# Patient Record
Sex: MALE | Race: WHITE | NOT HISPANIC OR LATINO
[De-identification: names, ages, dates, MRNs, and addresses within clinical notes are randomized per-mention and may not be internally consistent; named-entity substitution may affect disease eponyms.]

---

## 2022-07-13 ENCOUNTER — APPOINTMENT (OUTPATIENT)
Dept: ORTHOPEDIC SURGERY | Facility: CLINIC | Age: 62
End: 2022-07-13

## 2022-07-13 VITALS — HEIGHT: 72 IN | WEIGHT: 300 LBS | BODY MASS INDEX: 40.63 KG/M2

## 2022-07-13 PROCEDURE — 99213 OFFICE O/P EST LOW 20 MIN: CPT

## 2022-07-13 NOTE — HISTORY OF PRESENT ILLNESS
[Gradual] : gradual [0] : 0 [de-identified] : 7/13/22: This is Mr. EDOUARD GONZALES  a 62 year old male with bilateral long standing knee arthritis who comes in today complaining of bilateral knee pain.  Treated for several years with HA injections every 6 months. Has had excellent results and pain is now starting again. Wants to start Visco-3. Last series November 2021. \par Hx of knee arthroscopy and CSI, PT in the past.\par \par 4/17/21: f/u B/L knees, for Synvisc #1 B/L knees today. \par 4/22/21: Here for Synvisc #2 to both knees.\par 4/28/21- Synvisc #3 bilateral knees today.\par 9/27/21- he recently had increased irritation and inflammation to right knee knee. notes difficutly walking. medial pain. \par 11/16/21- visco-3 #1 b knee today\par 11/30/21- Here for visco-3 #3 in b/l knees today. He was seen by Dr. Neri last week for #2 inj [] : no [FreeTextEntry6] : na [FreeTextEntry7] : na [FreeTextEntry8] : na [FreeTextEntry9] : na [de-identified] : na

## 2022-07-13 NOTE — ASSESSMENT
[FreeTextEntry1] : will submit for auth to repeat HA injections to bilateral knees. fu for injections. \par \par 4/17/21: B/L knee Synvisc tolerated well in office today. Ice to affected area. fu next week to continue series.\par 4/22/21: Synvisc #2 to both knees. Tolerated well. RTC 1 week.\par 4/28/21- Synvisc #3 bilateral knees. fu 6 monhts for repeat injection. \par 9/27/21 - Right knee CSI tolerated well. 45 cc NSF aspirated. Will request auth B/L knee euflexxa\par 10/19/21-diclfoenac. fu 2 weeks for HA injections. submit for auth for euflexxa bilateral knee. diclo\par 11/16/21- visco-3 #1 b knee today\par 11/30/21- visco-3 #3 b knee today. tolerated well

## 2022-07-13 NOTE — IMAGING
[de-identified] : RIght knee: \par \par Inspection: \par moderate effusion\par no atrophy, skin intact\par \par Palpation: \par anterior and medial joint line tenderness\par \par ROM: \par Flexion 120 with pain\par Ext 0 with pain\par \par Strength:\par quadriceps 5/5\par hamstrings 5/5\par \par Ligament stability and special tests\par Mc Flores (+)\par Pivot shift (+)\par Ligament stable\par \par Neuro:\par Light touch intact\par \par Gait and function:\par mild antalgic (-)\par assistive device (-)\par \par Left knee:\par \par same as the right knee exam\par \par

## 2022-07-19 ENCOUNTER — APPOINTMENT (OUTPATIENT)
Dept: ORTHOPEDIC SURGERY | Facility: CLINIC | Age: 62
End: 2022-07-19

## 2022-07-19 VITALS — WEIGHT: 300 LBS | BODY MASS INDEX: 40.63 KG/M2 | HEIGHT: 72 IN

## 2022-07-19 PROCEDURE — 20610 DRAIN/INJ JOINT/BURSA W/O US: CPT | Mod: 50

## 2022-07-19 PROCEDURE — 99213 OFFICE O/P EST LOW 20 MIN: CPT | Mod: 25

## 2022-07-19 NOTE — ASSESSMENT
[FreeTextEntry1] : will submit for auth to repeat HA injections to bilateral knees. fu for injections. \par \par 4/17/21: B/L knee Synvisc tolerated well in office today. Ice to affected area. fu next week to continue series.\par 4/22/21: Synvisc #2 to both knees. Tolerated well. RTC 1 week.\par 4/28/21- Synvisc #3 bilateral knees. fu 6 monhts for repeat injection. \par 9/27/21 - Right knee CSI tolerated well. 45 cc NSF aspirated. Will request auth B/L knee euflexxa\par 10/19/21-diclfoenac. fu 2 weeks for HA injections. submit for auth for euflexxa bilateral knee. diclo\par 11/16/21- visco-3 #1 b knee today\par 11/30/21- visco-3 #3 b knee today. tolerated well\par \par 7/19/22: visco-3 #1 b knee today, tolerated well

## 2022-07-19 NOTE — HISTORY OF PRESENT ILLNESS
[Gradual] : gradual [0] : 0 [1] : 1 [Other:____] : [unfilled] [de-identified] : 7/13/22: This is MrOmar GONZALES  a 62 year old male with bilateral long standing knee arthritis who comes in today complaining of bilateral knee pain.  Treated for several years with HA injections every 6 months. Has had excellent results and pain is now starting again. Wants to start Visco-3. Last series November 2021. \par Hx of knee arthroscopy and CSI, PT in the past.\par \par 4/17/21: f/u B/L knees, for Synvisc #1 B/L knees today. \par 4/22/21: Here for Synvisc #2 to both knees.\par 4/28/21- Synvisc #3 bilateral knees today.\par 9/27/21- he recently had increased irritation and inflammation to right knee knee. notes difficutly walking. medial pain. \par 11/16/21- visco-3 #1 b knee today\par 11/30/21- Here for visco-3 #3 in b/l knees today. He was seen by Dr. Neri last week for #2 inj\par \par 7/19/22: HEre for Visco-3 #1 B/L knees  [] : This patient has had an injection before: no [FreeTextEntry6] : na [FreeTextEntry7] : na [FreeTextEntry8] : na [FreeTextEntry9] : na [de-identified] : na

## 2022-07-19 NOTE — IMAGING
[de-identified] : RIght knee: \par \par Inspection: \par moderate effusion\par no atrophy, skin intact\par \par Palpation: \par anterior and medial joint line tenderness\par \par ROM: \par Flexion 120 with pain\par Ext 0 with pain\par \par Strength:\par quadriceps 5/5\par hamstrings 5/5\par \par Ligament stability and special tests\par Mc Flores (+)\par Pivot shift (+)\par Ligament stable\par \par Neuro:\par Light touch intact\par \par Gait and function:\par mild antalgic (-)\par assistive device (-)\par \par Left knee:\par \par same as the right knee exam\par \par

## 2022-07-19 NOTE — PROCEDURE
[Large Joint Injection] : Large joint injection [Bilateral] : bilaterally of the [Knee] : knee [Pain] : pain [Inflammation] : inflammation [X-ray evidence of Osteoarthritis on this or prior visit] : x-ray evidence of Osteoarthritis on this or prior visit [Repeat series performed] : repeat series performed [Alcohol] : alcohol [Betadine] : betadine [Ethyl Chloride sprayed topically] : ethyl chloride sprayed topically [Sterile technique used] : sterile technique used [___ cc    1%] : Lidocaine ~Vcc of 1%  [Visco-3] : Visco-3 [#1] : series #1 [] : Patient tolerated procedure well [Call if redness, pain or fever occur] : call if redness, pain or fever occur [Apply ice for 15min out of every hour for the next 12-24 hours as tolerated] : apply ice for 15 minutes out of every hour for the next 12-24 hours as tolerated [Patient was advised to rest the joint(s) for ____ days] : patient was advised to rest the joint(s) for [unfilled] days [Previous OTC use and PT nontherapeutic] : patient has tried OTC's including aspirin, Ibuprofen, Aleve, etc or prescription NSAIDS, and/or exercises at home and/or physical therapy without satisfactory response [Risks, benefits, alternatives discussed / Verbal consent obtained] : the risks benefits, and alternatives have been discussed, and verbal consent was obtained

## 2022-07-26 ENCOUNTER — APPOINTMENT (OUTPATIENT)
Dept: ORTHOPEDIC SURGERY | Facility: CLINIC | Age: 62
End: 2022-07-26

## 2022-07-26 VITALS — WEIGHT: 300 LBS | BODY MASS INDEX: 40.63 KG/M2 | HEIGHT: 72 IN

## 2022-07-26 DIAGNOSIS — Z78.9 OTHER SPECIFIED HEALTH STATUS: ICD-10-CM

## 2022-07-26 PROCEDURE — 20610 DRAIN/INJ JOINT/BURSA W/O US: CPT | Mod: 50

## 2022-07-26 PROCEDURE — 99024 POSTOP FOLLOW-UP VISIT: CPT

## 2022-07-26 NOTE — IMAGING
[de-identified] : RIght knee: \par \par Inspection: \par moderate effusion\par no atrophy, skin intact\par \par Palpation: \par anterior and medial joint line tenderness\par \par ROM: \par Flexion 120 with pain\par Ext 0 with pain\par \par Strength:\par quadriceps 5/5\par hamstrings 5/5\par \par Ligament stability and special tests\par Mc Flores (+)\par Pivot shift (+)\par Ligament stable\par \par Neuro:\par Light touch intact\par \par Gait and function:\par mild antalgic (-)\par assistive device (-)\par \par Left knee:\par \par same as the right knee exam\par \par

## 2022-07-26 NOTE — HISTORY OF PRESENT ILLNESS
[Gradual] : gradual [0] : 0 [Other:____] : [unfilled] [2] : 2 [de-identified] : here for visco 3 # 3 injecttion bilatera knees today\par \par \par 7/13/22: This is Mr. EDOUARD GONZALES  a 62 year old male with bilateral long standing knee arthritis who comes in today complaining of bilateral knee pain.  Treated for several years with HA injections every 6 months. Has had excellent results and pain is now starting again. Wants to start Visco-3. Last series November 2021. \par Hx of knee arthroscopy and CSI, PT in the past.\par \par  [] : Post Surgical Visit: no [FreeTextEntry6] : na [FreeTextEntry7] : na [FreeTextEntry8] : na [FreeTextEntry9] : na [de-identified] : na [de-identified] : 7/19/22 [de-identified] : john knees

## 2022-07-26 NOTE — DISCUSSION/SUMMARY
[Medication Risks Reviewed] : Medication risks reviewed [de-identified] : visco 3 #3 injected into bilateral knees today. fu 6 months prn. \par \par \par The patient was advised of the diagnosis.  The natural history of the pathology was explained in full. All questions were answered.  The risks and benefits of conservative and interventional treatment alternatives were explained to the patient\par \par ------------------------------------------------------------------------------------------------------------------------------------------------------\par \par Viscosupplementation injection given: to each knee\par \par Viscosupplementation injection indications at this time include- X-ray evidence of osteoarthritis on this or prior visits, and patient having tried OTC's including aspirin, Ibuprofen, Aleve etc or prescription NSAIDS, and/or exercises at home and/ or physical therapy without satisfactory response.\par \par The risks, benefits, and alternatives to viscosupplementation injection were explained in full to the patient. Risks outlined include but are not limited to infection, sepsis, bleeding, scarring, skin discoloration, temporary increase in pain, syncopal episode, failure to resolve symptoms, allergic reaction, and symptom recurrence.  Patient understood the risks. All questions were answered. After discussion of options, the patient requested viscosupplementation. \par \par An injection of Hyaluronic acid of appropriate formulation was injected into the joint(s) after verbal consent, using sterile technique. The patient tolerated the procedure well and there were no complications.  Instructed patient to apply ice to the injection site. Signs and symptoms of infection reviewed and patient advised to call immediately for redness, fevers, and/or chills.\par \par ------------------------------------------------------------------------------------------------------------------------------------------------------\par \par

## 2022-08-01 ENCOUNTER — APPOINTMENT (OUTPATIENT)
Dept: ORTHOPEDIC SURGERY | Facility: CLINIC | Age: 62
End: 2022-08-01

## 2022-08-01 PROCEDURE — 20610 DRAIN/INJ JOINT/BURSA W/O US: CPT | Mod: 50

## 2022-08-01 PROCEDURE — 99214 OFFICE O/P EST MOD 30 MIN: CPT | Mod: 25

## 2022-08-01 PROCEDURE — 99204 OFFICE O/P NEW MOD 45 MIN: CPT | Mod: 25

## 2022-08-01 NOTE — IMAGING

## 2022-08-01 NOTE — PROCEDURE
[FreeTextEntry3] : \par Injection Procedure Note:\par \par The risks, benefits, and alternatives to viscosupplementation injection were reviewed with the patient. Risks outlined include but are not limited to infection, sepsis, bleeding, scarring, temporary increase in pain, syncopal episode, failure to resolve symptoms, symptoms recurrence, allergic reaction, flare reaction, pseudoseptic reaction.  Patient understood the risks and asked to proceed with this treatment course.\par \par Patient Identification\par Name/: Verbal with patient and/or family\par \par Procedure Verification:\par Procedure confirmed with patient or family/designee\par Consent for procedure: Verbal Consent Given\par Relevant documentation completed, reviewed, and signed\par Clinical indications for procedure confirmed\par \par Time-out with all members of procedure team immediately prior to procedure:\par Correct patient identified. Agreement on procedure. Correct side and site.\par \par \par KNEE INJECTION (Visco) - BILATERAL\par After verbal consent and identification of the correct patient and correct site, bilateral knees were prepped using alcohol swabs and betadine. This was allowed time to air dry. \par An injection of  Visco-3 2.5ml \par was injected into the knees using a sterile 22G needle after ethyl chloride spray for skin anesthesia. The patient tolerated the procedure well. After-care instructions were provided and included instructions to ice the area and to call if redness, pain, or fever develop.\par

## 2022-08-01 NOTE — HISTORY OF PRESENT ILLNESS
[de-identified] : 62 year old male  chronic bilateral knee pain that is ant and medial and deep and worse with acvtivity. assoc with clicking and stiffnes.  has seen Dr. CH and done CSI, gel , PT in past \par \par \par

## 2022-08-01 NOTE — ASSESSMENT
[FreeTextEntry1] : Imaging was reviewed and independently interpreted, notes from Dr. CH reviewed\par xrays show medial and patellofemoral degenerate changes.\par \par - We discussed their diagnosis and treatment options at length including surgical and non-surgical options.\par - We will continue conservative treatment with activity modification, PT, icing, weight loss, and anti-inflammatory medications.\par - The patient was provided with a PT prescription to work on ROM, hip ER/abductors strengthening, quad/hamstring stretches and strengthening, and other exercises \par - The patient was advised to let pain guide the gradual advancement of activities. \par -  we spoke about possibility of viscosupplementation injections and they want to proceed\par - b/l visco given torie well\par

## 2022-08-02 ENCOUNTER — APPOINTMENT (OUTPATIENT)
Dept: ORTHOPEDIC SURGERY | Facility: CLINIC | Age: 62
End: 2022-08-02

## 2023-02-19 ENCOUNTER — APPOINTMENT (OUTPATIENT)
Dept: ORTHOPEDIC SURGERY | Facility: CLINIC | Age: 63
End: 2023-02-19
Payer: COMMERCIAL

## 2023-02-19 VITALS — HEIGHT: 72 IN | BODY MASS INDEX: 40.63 KG/M2 | WEIGHT: 300 LBS

## 2023-02-19 PROCEDURE — 99213 OFFICE O/P EST LOW 20 MIN: CPT

## 2023-02-19 NOTE — DISCUSSION/SUMMARY
[de-identified] : 62m with b/l knee djd. history of visco with good relief.  will submit for repeat visco\par 1) wbat\par 2) cryotherapy\par 3) rtc  for injections\par

## 2023-02-19 NOTE — IMAGING

## 2023-02-19 NOTE — HISTORY OF PRESENT ILLNESS
[1] : 2 [Rest] : rest [de-identified] : b/l knee [FreeTextEntry5] : pt states he sees dr PHILLIPS for his b/l knee, states no specific injury. completed visco in early august. the pain just started coming back

## 2023-02-22 ENCOUNTER — APPOINTMENT (OUTPATIENT)
Dept: ORTHOPEDIC SURGERY | Facility: CLINIC | Age: 63
End: 2023-02-22
Payer: COMMERCIAL

## 2023-02-22 VITALS — WEIGHT: 300 LBS | HEIGHT: 72 IN | BODY MASS INDEX: 40.63 KG/M2

## 2023-02-22 PROCEDURE — 73564 X-RAY EXAM KNEE 4 OR MORE: CPT | Mod: 50

## 2023-02-22 PROCEDURE — 99213 OFFICE O/P EST LOW 20 MIN: CPT

## 2023-02-22 NOTE — HISTORY OF PRESENT ILLNESS
[de-identified] : 02/22/2023 : EDOUARD GONZALES is a 62 year male presenting today for bilateral knee pain R>L. History of chronic knee pain for >10 years with no specific ANDI. Worse with increased activity. Better with rest. Prior treatment: visco injections with Dr. Swain in july 2022. Occupation: CPA\par

## 2023-02-22 NOTE — IMAGING
[de-identified] : \par RIGHT KNEE\par Inspection:  mild effusion\par Palpation: medial joint line tenderness, anterior tenderness\par Knee Range of Motion:  3-125 \par Strength: 5/5 Quadriceps strength, 5/5 Hamstring strength\par Neurological: light touch is intact throughout\par Ligament Stability and Special Tests: \par McMurrays: neg\par Lachman: neg\par Pivot Shift: neg\par Posterior Drawer: neg\par Valgus: neg\par Varus: neg\par Patella Apprehension: neg\par Patella Maltracking: neg\par \par \par LEFT KNEE\par Inspection:  mild effusion\par Palpation: medial joint line tenderness, anterior tenderness\par Knee Range of Motion:  3-125 \par Strength: 5/5 Quadriceps strength, 5/5 Hamstring strength\par Neurological: light touch is intact throughout\par Ligament Stability and Special Tests: \par McMurrays: neg\par Lachman: neg\par Pivot Shift: neg\par Posterior Drawer: neg\par Valgus: neg\par Varus: neg\par Patella Apprehension: neg\par Patella Maltracking: neg\par  [Bilateral] : knee bilaterally [All Views] : anteroposterior, lateral, skyline, and anteroposterior standing [There are no fractures, subluxations or dislocations. No significant abnormalities are seen] : There are no fractures, subluxations or dislocations. No significant abnormalities are seen [Degenerative change] : Degenerative change

## 2023-02-22 NOTE — DISCUSSION/SUMMARY
[de-identified] : 62m with b/l knee djd. history of visco with good relief.  will submit for repeat visco-3 injections\par 1) wbat\par 2) cryotherapy\par 3) rtc  for injections\par \par Entered by Gloria Wilson acting as scribe.

## 2023-03-01 ENCOUNTER — APPOINTMENT (OUTPATIENT)
Dept: ORTHOPEDIC SURGERY | Facility: CLINIC | Age: 63
End: 2023-03-01
Payer: COMMERCIAL

## 2023-03-01 VITALS — BODY MASS INDEX: 40.63 KG/M2 | HEIGHT: 72 IN | WEIGHT: 300 LBS

## 2023-03-01 PROCEDURE — 99212 OFFICE O/P EST SF 10 MIN: CPT | Mod: 25

## 2023-03-01 PROCEDURE — 20610 DRAIN/INJ JOINT/BURSA W/O US: CPT

## 2023-03-01 NOTE — HISTORY OF PRESENT ILLNESS
[Other:____] : [unfilled] [de-identified] : 03/01/23: Here to f/up b/l knees and Visco-3 #1\par 02/22/2023 : EDOUARD GONZALES is a 62 year male presenting today for bilateral knee pain R>L. History of chronic knee pain for >10 years with no specific ANDI. Worse with increased activity. Better with rest. Prior treatment: visco injections with Dr. Swain in july 2022. Occupation: CPA\par  [] : This patient has had an injection before: no [FreeTextEntry1] : bilateral knees

## 2023-03-01 NOTE — DISCUSSION/SUMMARY
[de-identified] : 62m with b/l knee djd. Visco-3 #1. Injection tolerated well \par 1) wbat, cryotherapy\par 2) rtc 1 week \par \par Entered by Gloria Wilson acting as scribe.

## 2023-03-01 NOTE — PROCEDURE
[Large Joint Injection] : Large joint injection [Bilateral] : bilaterally of the [Knee] : knee [Pain] : pain [Inflammation] : inflammation [Alcohol] : alcohol [Betadine] : betadine [Ethyl Chloride sprayed topically] : ethyl chloride sprayed topically [Sterile technique used] : sterile technique used [Visco-3 (25mg)] : 25mg of Visco-3 [#1] : series #1 [] : Patient tolerated procedure well [Previous OTC use and PT nontherapeutic] : patient has tried OTC's including aspirin, Ibuprofen, Aleve, etc or prescription NSAIDS, and/or exercises at home and/or physical therapy without satisfactory response [Patient had decreased mobility in the joint] : patient had decreased mobility in the joint [Risks, benefits, alternatives discussed / Verbal consent obtained] : the risks benefits, and alternatives have been discussed, and verbal consent was obtained

## 2023-03-01 NOTE — IMAGING

## 2023-03-07 ENCOUNTER — APPOINTMENT (OUTPATIENT)
Dept: ORTHOPEDIC SURGERY | Facility: CLINIC | Age: 63
End: 2023-03-07
Payer: COMMERCIAL

## 2023-03-07 VITALS — HEIGHT: 72 IN | BODY MASS INDEX: 40.63 KG/M2 | WEIGHT: 300 LBS

## 2023-03-07 PROCEDURE — 99212 OFFICE O/P EST SF 10 MIN: CPT | Mod: 25

## 2023-03-07 PROCEDURE — 20610 DRAIN/INJ JOINT/BURSA W/O US: CPT

## 2023-03-07 NOTE — HISTORY OF PRESENT ILLNESS
[1] : 2 [de-identified] : 3/7/23: Here to f/up b/l knees and Visco-3 #2\par 03/01/23: Here to f/up b/l knees and Visco-3 #1\par 02/22/2023 : EDOUARD GONZALES is a 62 year male presenting today for bilateral knee pain R>L. History of chronic knee pain for >10 years with no specific ANDI. Worse with increased activity. Better with rest. Prior treatment: visco injections with Dr. Swain in july 2022. Occupation: CPA\par 3/7/23: B/L knee visco three #2. Doing well. Some swelling to the right knee, no fevers/chills. Does report improvement after the injections.

## 2023-03-07 NOTE — DISCUSSION/SUMMARY
[de-identified] : 62m with b/l knee djd. Visco-3 #2. Injection tolerated well \par 1) wbat, cryotherapy\par 2) rtc 1 week \par \par Entered by Gloria Wilson acting as scribe.\par \par \par

## 2023-03-07 NOTE — PROCEDURE
[Large Joint Injection] : Large joint injection [Bilateral] : bilaterally of the [Knee] : knee [Pain] : pain [Inflammation] : inflammation [Alcohol] : alcohol [Betadine] : betadine [Ethyl Chloride sprayed topically] : ethyl chloride sprayed topically [Sterile technique used] : sterile technique used [Visco-3 (25mg)] : 25mg of Visco-3 [#2] : series #2 [] : Patient tolerated procedure well [Previous OTC use and PT nontherapeutic] : patient has tried OTC's including aspirin, Ibuprofen, Aleve, etc or prescription NSAIDS, and/or exercises at home and/or physical therapy without satisfactory response [Patient had decreased mobility in the joint] : patient had decreased mobility in the joint [Risks, benefits, alternatives discussed / Verbal consent obtained] : the risks benefits, and alternatives have been discussed, and verbal consent was obtained

## 2023-03-07 NOTE — IMAGING

## 2023-03-15 ENCOUNTER — APPOINTMENT (OUTPATIENT)
Dept: ORTHOPEDIC SURGERY | Facility: CLINIC | Age: 63
End: 2023-03-15
Payer: COMMERCIAL

## 2023-03-15 PROCEDURE — 99212 OFFICE O/P EST SF 10 MIN: CPT | Mod: 25

## 2023-03-15 PROCEDURE — 20610 DRAIN/INJ JOINT/BURSA W/O US: CPT | Mod: 50

## 2023-03-15 NOTE — IMAGING

## 2023-03-15 NOTE — DISCUSSION/SUMMARY
[de-identified] : 62m with b/l knee djd. Visco-3 #3. Injection tolerated well \par 1) wbat, cryotherapy\par 2) rtc 6-8 weeks \par \par Entered by Gloria Wilson acting as scribe.\par \par \par

## 2023-03-15 NOTE — PROCEDURE
[Large Joint Injection] : Large joint injection [Bilateral] : bilaterally of the [Knee] : knee [Pain] : pain [Inflammation] : inflammation [Alcohol] : alcohol [Betadine] : betadine [Ethyl Chloride sprayed topically] : ethyl chloride sprayed topically [Sterile technique used] : sterile technique used [Visco-3 (25mg)] : 25mg of Visco-3 [] : Patient tolerated procedure well [Previous OTC use and PT nontherapeutic] : patient has tried OTC's including aspirin, Ibuprofen, Aleve, etc or prescription NSAIDS, and/or exercises at home and/or physical therapy without satisfactory response [Patient had decreased mobility in the joint] : patient had decreased mobility in the joint [Risks, benefits, alternatives discussed / Verbal consent obtained] : the risks benefits, and alternatives have been discussed, and verbal consent was obtained [#3] : series #3

## 2023-03-15 NOTE — HISTORY OF PRESENT ILLNESS
[de-identified] : 3/15/23: here to f/up b/l knees and Visco-3 #3\par 3/7/23: Here to f/up b/l knees and Visco-3 #2\par 03/01/23: Here to f/up b/l knees and Visco-3 #1\par 02/22/2023 : EDOUARD GONZALES is a 62 year male presenting today for bilateral knee pain R>L. History of chronic knee pain for >10 years with no specific ANDI. Worse with increased activity. Better with rest. Prior treatment: visco injections with Dr. Swain in july 2022. Occupation: CPA\par 3/7/23: B/L knee visco three #2. Doing well. Some swelling to the right knee, no fevers/chills. Does report improvement after the injections.

## 2023-11-17 ENCOUNTER — APPOINTMENT (OUTPATIENT)
Dept: ORTHOPEDIC SURGERY | Facility: CLINIC | Age: 63
End: 2023-11-17
Payer: COMMERCIAL

## 2023-11-17 VITALS — HEIGHT: 72 IN | WEIGHT: 300 LBS | BODY MASS INDEX: 40.63 KG/M2

## 2023-11-17 PROCEDURE — 99214 OFFICE O/P EST MOD 30 MIN: CPT

## 2023-11-22 ENCOUNTER — APPOINTMENT (OUTPATIENT)
Dept: ORTHOPEDIC SURGERY | Facility: CLINIC | Age: 63
End: 2023-11-22
Payer: COMMERCIAL

## 2023-11-22 VITALS — HEIGHT: 72 IN | WEIGHT: 300 LBS | BODY MASS INDEX: 40.63 KG/M2

## 2023-11-22 PROCEDURE — 99024 POSTOP FOLLOW-UP VISIT: CPT

## 2023-11-22 PROCEDURE — 20610 DRAIN/INJ JOINT/BURSA W/O US: CPT | Mod: 50

## 2023-11-29 ENCOUNTER — APPOINTMENT (OUTPATIENT)
Dept: ORTHOPEDIC SURGERY | Facility: CLINIC | Age: 63
End: 2023-11-29
Payer: COMMERCIAL

## 2023-11-29 VITALS — HEIGHT: 72 IN | BODY MASS INDEX: 40.63 KG/M2 | WEIGHT: 300 LBS

## 2023-11-29 PROCEDURE — 99024 POSTOP FOLLOW-UP VISIT: CPT

## 2023-11-29 PROCEDURE — 20610 DRAIN/INJ JOINT/BURSA W/O US: CPT | Mod: 50

## 2023-12-05 ENCOUNTER — APPOINTMENT (OUTPATIENT)
Dept: ORTHOPEDIC SURGERY | Facility: CLINIC | Age: 63
End: 2023-12-05
Payer: COMMERCIAL

## 2023-12-05 VITALS — HEIGHT: 72 IN | WEIGHT: 300 LBS | BODY MASS INDEX: 40.63 KG/M2

## 2023-12-05 PROCEDURE — 99024 POSTOP FOLLOW-UP VISIT: CPT

## 2023-12-05 PROCEDURE — 20610 DRAIN/INJ JOINT/BURSA W/O US: CPT | Mod: 50

## 2024-02-14 ENCOUNTER — APPOINTMENT (OUTPATIENT)
Dept: ORTHOPEDIC SURGERY | Facility: CLINIC | Age: 64
End: 2024-02-14
Payer: COMMERCIAL

## 2024-02-14 VITALS — BODY MASS INDEX: 41.31 KG/M2 | HEIGHT: 72 IN | WEIGHT: 305 LBS

## 2024-02-14 PROCEDURE — 73610 X-RAY EXAM OF ANKLE: CPT | Mod: RT

## 2024-02-14 PROCEDURE — L1902: CPT | Mod: RT

## 2024-02-14 PROCEDURE — 99214 OFFICE O/P EST MOD 30 MIN: CPT

## 2024-02-14 NOTE — ASSESSMENT
[FreeTextEntry1] : wbat PT mobic avoid jumping/cutting airheel f/up 6wks  The patient's current medication management of their orthopedic diagnosis was reviewed today:  (1) We discussed a comprehensive treatment plan that included possible pharmaceutical management involving the use of prescription strength medications including but not limited to options such as oral Naprosyn 500mg BID, once daily Meloxicam 15 mg, or 500-650 mg Tylenol versus over the counter oral medications and topical prescription NSAID Pennsaid vs over the counter Voltaren gel. (2) There is a moderate risk of morbidity with further treatment, especially from use of prescription strength medications and possible side effects of these medications which include upset stomach with oral medications, skin reactions to topical medications and cardiac/renal issues with long term use. (3) I recommended that the patient follow-up with their medical physician to discuss any significant specific potential issues with long term medication use such as interactions with current medications or with exacerbation of underlying medical comorbidities. (4) The benefits and risks associated with use of injectable, oral or topical, prescription and over the counter anti-inflammatory medications were discussed with the patient. The patient voiced understanding of the risks including but not limited to bleeding, stroke, kidney dysfunction, heart disease, and were referred to the black box warning label for further information.

## 2024-02-14 NOTE — PHYSICAL EXAM
[Right] : right foot and ankle [Mild] : mild swelling over achilles tendon [NL (40)] : plantar flexion 40 degrees [5___] : plantar flexion 5[unfilled]/5 [2+] : dorsalis pedis pulse: 2+ [] : patient ambulates without assistive device [de-identified] : normal bullard [TWNoteComboBox7] : dorsiflexion 15 degrees

## 2024-02-14 NOTE — HISTORY OF PRESENT ILLNESS
[Sharp] : sharp [Constant] : constant [de-identified] : 02/14/2024: 1.5 wks achilles pain. denies specific injury. no prior ankle probs. no tx to date. denies dm/tob.  [] : Post Surgical Visit: no [FreeTextEntry1] : CARINA Magaña

## 2024-03-12 ENCOUNTER — RX RENEWAL (OUTPATIENT)
Age: 64
End: 2024-03-12

## 2024-03-12 RX ORDER — MELOXICAM 15 MG/1
15 TABLET ORAL
Qty: 30 | Refills: 0 | Status: ACTIVE | COMMUNITY
Start: 2024-02-14 | End: 1900-01-01

## 2024-03-27 ENCOUNTER — APPOINTMENT (OUTPATIENT)
Dept: ORTHOPEDIC SURGERY | Facility: CLINIC | Age: 64
End: 2024-03-27
Payer: COMMERCIAL

## 2024-03-27 VITALS — HEIGHT: 72 IN | BODY MASS INDEX: 41.31 KG/M2 | WEIGHT: 305 LBS

## 2024-03-27 PROCEDURE — 99213 OFFICE O/P EST LOW 20 MIN: CPT

## 2024-03-27 NOTE — HISTORY OF PRESENT ILLNESS
[Sharp] : sharp [Constant] : constant [de-identified] : 02/14/2024: 1.5 wks achilles pain. denies specific injury. no prior ankle probs. no tx to date. denies dm/tob.   03/27/2024:  improving. going to PT. not using brace on instruction of PT [] : Post Surgical Visit: no [FreeTextEntry1] : CARINA Magaña

## 2024-03-27 NOTE — PHYSICAL EXAM
[Right] : right foot and ankle [Mild] : mild swelling over achilles tendon [NL (40)] : plantar flexion 40 degrees [5___] : plantar flexion 5[unfilled]/5 [2+] : dorsalis pedis pulse: 2+ [] : no deltoid ligament tenderness [FreeTextEntry8] : improved [de-identified] : normal bullard [TWNoteComboBox7] : dorsiflexion 15 degrees

## 2024-03-27 NOTE — ASSESSMENT
[FreeTextEntry1] : wbat PT taking mobic avoid jumping/cutting f/up 6wks  The patient's current medication management of their orthopedic diagnosis was reviewed today:  (1) We discussed a comprehensive treatment plan that included possible pharmaceutical management involving the use of prescription strength medications including but not limited to options such as oral Naprosyn 500mg BID, once daily Meloxicam 15 mg, or 500-650 mg Tylenol versus over the counter oral medications and topical prescription NSAID Pennsaid vs over the counter Voltaren gel. (2) There is a moderate risk of morbidity with further treatment, especially from use of prescription strength medications and possible side effects of these medications which include upset stomach with oral medications, skin reactions to topical medications and cardiac/renal issues with long term use. (3) I recommended that the patient follow-up with their medical physician to discuss any significant specific potential issues with long term medication use such as interactions with current medications or with exacerbation of underlying medical comorbidities. (4) The benefits and risks associated with use of injectable, oral or topical, prescription and over the counter anti-inflammatory medications were discussed with the patient. The patient voiced understanding of the risks including but not limited to bleeding, stroke, kidney dysfunction, heart disease, and were referred to the black box warning label for further information.

## 2024-04-04 ENCOUNTER — EMERGENCY (EMERGENCY)
Facility: HOSPITAL | Age: 64
LOS: 1 days | Discharge: HOME | End: 2024-04-04
Attending: EMERGENCY MEDICINE
Payer: COMMERCIAL

## 2024-04-04 VITALS
HEART RATE: 87 BPM | SYSTOLIC BLOOD PRESSURE: 145 MMHG | HEIGHT: 72 IN | WEIGHT: 309.97 LBS | OXYGEN SATURATION: 95 % | TEMPERATURE: 98 F | DIASTOLIC BLOOD PRESSURE: 85 MMHG | RESPIRATION RATE: 20 BRPM

## 2024-04-04 LAB
ALBUMIN SERPL ELPH-MCNC: 4.5 G/DL — SIGNIFICANT CHANGE UP (ref 3.3–5)
ALP SERPL-CCNC: 46 U/L — SIGNIFICANT CHANGE UP (ref 40–120)
ALT FLD-CCNC: 28 U/L — SIGNIFICANT CHANGE UP (ref 10–45)
ANION GAP SERPL CALC-SCNC: 12 MMOL/L — SIGNIFICANT CHANGE UP (ref 5–17)
APTT BLD: 28.1 SEC — SIGNIFICANT CHANGE UP (ref 24.5–35.6)
AST SERPL-CCNC: 25 U/L — SIGNIFICANT CHANGE UP (ref 10–40)
BASOPHILS # BLD AUTO: 0.04 K/UL — SIGNIFICANT CHANGE UP (ref 0–0.2)
BASOPHILS NFR BLD AUTO: 0.6 % — SIGNIFICANT CHANGE UP (ref 0–2)
BILIRUB SERPL-MCNC: 0.7 MG/DL — SIGNIFICANT CHANGE UP (ref 0.2–1.2)
BUN SERPL-MCNC: 15 MG/DL — SIGNIFICANT CHANGE UP (ref 7–23)
CALCIUM SERPL-MCNC: 10.5 MG/DL — SIGNIFICANT CHANGE UP (ref 8.4–10.5)
CHLORIDE SERPL-SCNC: 100 MMOL/L — SIGNIFICANT CHANGE UP (ref 96–108)
CO2 SERPL-SCNC: 28 MMOL/L — SIGNIFICANT CHANGE UP (ref 22–31)
CREAT SERPL-MCNC: 1.24 MG/DL — SIGNIFICANT CHANGE UP (ref 0.5–1.3)
EGFR: 66 ML/MIN/1.73M2 — SIGNIFICANT CHANGE UP
EOSINOPHIL # BLD AUTO: 0.13 K/UL — SIGNIFICANT CHANGE UP (ref 0–0.5)
EOSINOPHIL NFR BLD AUTO: 1.8 % — SIGNIFICANT CHANGE UP (ref 0–6)
GLUCOSE SERPL-MCNC: 109 MG/DL — HIGH (ref 70–99)
HCT VFR BLD CALC: 47.5 % — SIGNIFICANT CHANGE UP (ref 39–50)
HGB BLD-MCNC: 15.8 G/DL — SIGNIFICANT CHANGE UP (ref 13–17)
IMM GRANULOCYTES NFR BLD AUTO: 0.4 % — SIGNIFICANT CHANGE UP (ref 0–0.9)
INR BLD: 1.07 RATIO — SIGNIFICANT CHANGE UP (ref 0.85–1.18)
LYMPHOCYTES # BLD AUTO: 1.22 K/UL — SIGNIFICANT CHANGE UP (ref 1–3.3)
LYMPHOCYTES # BLD AUTO: 17.3 % — SIGNIFICANT CHANGE UP (ref 13–44)
MCHC RBC-ENTMCNC: 29.5 PG — SIGNIFICANT CHANGE UP (ref 27–34)
MCHC RBC-ENTMCNC: 33.3 GM/DL — SIGNIFICANT CHANGE UP (ref 32–36)
MCV RBC AUTO: 88.6 FL — SIGNIFICANT CHANGE UP (ref 80–100)
MONOCYTES # BLD AUTO: 0.53 K/UL — SIGNIFICANT CHANGE UP (ref 0–0.9)
MONOCYTES NFR BLD AUTO: 7.5 % — SIGNIFICANT CHANGE UP (ref 2–14)
NEUTROPHILS # BLD AUTO: 5.11 K/UL — SIGNIFICANT CHANGE UP (ref 1.8–7.4)
NEUTROPHILS NFR BLD AUTO: 72.4 % — SIGNIFICANT CHANGE UP (ref 43–77)
NRBC # BLD: 0 /100 WBCS — SIGNIFICANT CHANGE UP (ref 0–0)
PLATELET # BLD AUTO: 195 K/UL — SIGNIFICANT CHANGE UP (ref 150–400)
POTASSIUM SERPL-MCNC: 4.6 MMOL/L — SIGNIFICANT CHANGE UP (ref 3.5–5.3)
POTASSIUM SERPL-SCNC: 4.6 MMOL/L — SIGNIFICANT CHANGE UP (ref 3.5–5.3)
PROT SERPL-MCNC: 7.7 G/DL — SIGNIFICANT CHANGE UP (ref 6–8.3)
PROTHROM AB SERPL-ACNC: 11.2 SEC — SIGNIFICANT CHANGE UP (ref 9.5–13)
RBC # BLD: 5.36 M/UL — SIGNIFICANT CHANGE UP (ref 4.2–5.8)
RBC # FLD: 13.2 % — SIGNIFICANT CHANGE UP (ref 10.3–14.5)
SODIUM SERPL-SCNC: 140 MMOL/L — SIGNIFICANT CHANGE UP (ref 135–145)
TROPONIN T, HIGH SENSITIVITY RESULT: 15 NG/L — SIGNIFICANT CHANGE UP (ref 0–51)
TROPONIN T, HIGH SENSITIVITY RESULT: 18 NG/L — SIGNIFICANT CHANGE UP (ref 0–51)
WBC # BLD: 7.06 K/UL — SIGNIFICANT CHANGE UP (ref 3.8–10.5)
WBC # FLD AUTO: 7.06 K/UL — SIGNIFICANT CHANGE UP (ref 3.8–10.5)

## 2024-04-04 PROCEDURE — 70496 CT ANGIOGRAPHY HEAD: CPT | Mod: 26,MC

## 2024-04-04 PROCEDURE — 70498 CT ANGIOGRAPHY NECK: CPT | Mod: 26,MC

## 2024-04-04 PROCEDURE — 70450 CT HEAD/BRAIN W/O DYE: CPT | Mod: 26,MC,59

## 2024-04-04 PROCEDURE — 99223 1ST HOSP IP/OBS HIGH 75: CPT

## 2024-04-04 RX ORDER — SODIUM CHLORIDE 9 MG/ML
500 INJECTION INTRAMUSCULAR; INTRAVENOUS; SUBCUTANEOUS ONCE
Refills: 0 | Status: COMPLETED | OUTPATIENT
Start: 2024-04-04 | End: 2024-04-04

## 2024-04-04 RX ORDER — ASPIRIN/CALCIUM CARB/MAGNESIUM 324 MG
81 TABLET ORAL DAILY
Refills: 0 | Status: DISCONTINUED | OUTPATIENT
Start: 2024-04-04 | End: 2024-04-08

## 2024-04-04 RX ADMIN — SODIUM CHLORIDE 500 MILLILITER(S): 9 INJECTION INTRAMUSCULAR; INTRAVENOUS; SUBCUTANEOUS at 21:44

## 2024-04-04 NOTE — ED PROVIDER NOTE - PHYSICAL EXAMINATION
Const: Awake, alert, no acute distress.  Well appearing.  Moving comfortably on stretcher.  HEENT: NC/AT.  Moist mucous membranes.  No pharyngeal erythema, no exudates.  Eyes: Extraocular movements intact b/l.  Pupils equal, round, and reactive to light b/l.  Conjunctiva pink.  No scleral icterus.  Neck: Neck supple, full ROM without pain.  Cardiac: Regular rate and regular rhythm. S1 S2 present.  Peripheral pulses 2+ and symmetric.  No LE edema.  No chest wall tenderness, no overlying skin changes.  Resp: Speaking in full sentences. No evidence of respiratory distress.  Good air entry b/l, breath sounds clear to auscultation b/l.  Abd: Non-distended, no overlying skin changes.  Soft, non-tender, no guarding, no rigidity, no rebound tenderness.  No palpable masses.  MSK: Spine midline and non-tender, no paraspinal tenderness, no palpable deformities or overlying skin changes or open wounds. No CVAT.  Skin: Normal coloration.  No rashes, abrasions or lacerations.  Neuro: Awake, alert & oriented x 3.  Moves all extremities spontaneously and symmetrically.  No focal deficits. normal strength and sensation to b/l UE and LEs, normal finger-to-nose, no dysdiadochokinesis, no pronator drift, normal gait, no ataxia.  Normal Romberg.

## 2024-04-04 NOTE — ED CDU PROVIDER INITIAL DAY NOTE - PHYSICAL EXAMINATION
CONSTITUTIONAL: Well appearing and in no apparent distress.  ENT: Airway patent, moist mucous membranes.   EYES: Pupils equal, round and reactive to light. EOMI. Conjunctiva normal appearing.   CARDIAC: Normal rate, regular rhythm.  Heart sounds S1, S2.    RESPIRATORY: Breath sounds clear and equal bilaterally.   GASTROINTESTINAL: Abdomen soft, non-tender, not distended.  MUSCULOSKELETAL: Spine appears normal.  NEUROLOGICAL: Alert and oriented x3, no focal deficits, no motor or sensory deficits. 5/5 muscle strength throughout.  SKIN: Skin normal color, warm, dry and intact.   PSYCHIATRIC: Normal mood and affect. CONSTITUTIONAL: Well appearing and in no apparent distress.  ENT: Airway patent, moist mucous membranes.   EYES: Pupils equal, round and reactive to light. EOMI. Conjunctiva normal appearing. Mild ptosis of L lid, pt and wife at bedside state this is chronic from injury as a child with a hockey stick.   CARDIAC: Normal rate, regular rhythm.  Heart sounds S1, S2.    RESPIRATORY: Breath sounds clear and equal bilaterally.   GASTROINTESTINAL: Abdomen soft, non-tender, not distended.  MUSCULOSKELETAL: Spine appears normal.  NEUROLOGICAL: Alert and oriented x3, no focal deficits, no motor or sensory deficits. 5/5 muscle strength throughout.  SKIN: Skin normal color, warm, dry and intact.   PSYCHIATRIC: Normal mood and affect.

## 2024-04-04 NOTE — ED CDU PROVIDER INITIAL DAY NOTE - CLINICAL SUMMARY MEDICAL DECISION MAKING FREE TEXT BOX
RGUJRAL 63-year-old male history of hyperlipidemia hypertension questionable history of a flutter on diltiazem no anticoagulation presents with an episode of visual disturbance in his left eye yesterday.  Patient states he felt lightheaded and felt a gray cloud in his left eye coming from the bottom to the top episode lasted about 5 minutes and resolved.  Patient followed up as a status ophthalmologist today and had a dilated exam that was normal and sent him to the hospital to evaluate for amaurosis fugax.  Patient denies any chest pain palpitations fevers chills or recurring symptoms.  On exam patient is well-appearing no acute distress left pupil currently dilated.  Extraocular muscles intact CN 2 through 12 intact 5 out of 5 upper and lower extremity with stable gait.  Positive S1-S2 lungs clear to auscultation bilaterally abdomen soft nontender.  Patient results reviewed including CTA.  Case reviewed with neurology recommended MRI and echo in CDU with telemetry monitoring.  Plan discussed with patient.

## 2024-04-04 NOTE — ED CDU PROVIDER DISPOSITION NOTE - ATTENDING APP SHARED VISIT CONTRIBUTION OF CARE
Attending MD Sanchez:   I personally have seen and examined this patient.  Physician assistant note reviewed and agree on plan of care and except where noted.  See below for details.     Seen in CDU [facility] 49    63M with PMH/PSH including HTN, HLD sent to the CDU after presenting to the ED with visual disturbance OS which resolved prior to presentation.  Reports since being in the Emergency Department no return of dizziness.  Denies change in vision, double vision, sudden loss of vision. Denies headache.  Denies chest pain, shortness of breath, abdominal pain, nausea, vomiting, diarrhea, urinary complaints.     Exam:   General: NAD  HENT: head NCAT, airway patent  Eyes: anicteric, no conjunctival injection, mild L ptosis (chronic), PERRL  Lungs: lungs CTAB with good inspiratory effort, no wheezing, no rhonchi, no rales  Cardiac: +S1S2, no obvious m/r/g  GI: abdomen soft with +BS, NT, ND  : no CVAT  MSK: ranging neck and extremities freely  Neuro: moving all extremities spontaneously, nonfocal  Psych: normal mood and affect     A/P: 63M with transient vision change, sent in for concern for amaurosis fugax, no acute issues at  this time.  Lab and radiology tests reviewed with patient.  Neuro and Ophtho notes reviewed and discussed with patient.   Patient stable for discharge. Follow up instructions given, importance of follow up emphasized, return to ED parameters reviewed and patient verbalized understanding.  All questions answered, all concerns addressed.

## 2024-04-04 NOTE — CONSULT NOTE ADULT - ASSESSMENT
ED vitals notable for: ***. Labs notable for: ***. CT imaging disclosed: ***. Exam notable for ***.     NIHSS on admission: ***  LKN: ***  pre-MRS: ***    Impression:    Recommendations template-incomplete TBS:  [] Admit to ***  [] Telemetry monitoring  [] Dysphagia screen  [] SBP goal <***  [] Aspirin 81mg ***  [] Atorvastatin 40-80mg (titrate LDL<70)  [] A1c, lipid panel  [] MRI brain w/o contrast  [] TTE with bubble study  [] ILR placement pending workup  [] Neurochecks, vitals q*h  [] Fall and aspiration precautions  [] PT/OT/SLP/CM  [] Diet: ***  [] DVT prophylaxis: enoxaparin 40mg q24h (unless medically contraindicated) AND SCDs  [] Stroke education provided  [] Smoking cessation education if patient is a current smoker  [] Please document MRS on discharge    NO tenecteplase d/t ***.  NO thrombectomy d/t ***.    Patient/plan d/w Stroke fellow,  ***, under the supervision of attending vascular neurologist  ***. To be seen by attending in the AM with attestation to follow. Recommendations were relayed directly to ***. Note delayed d/t emergent patient care. EDOUARD GONZALES is a 63y RIGHT handed man, with PMH significant for: HTN, HLD, class III obesity, who presents to [facility] ED on 4/4/2024, at the behest of his ophthalmologist, with c/o transient vision loss in his LEFT eye. On 4/3/2024 (day PTA) - patient was sitting at his computer. Had a quick sensation of dizziness, before his vision went hazy. Says he saw a "gray cloud" travel up his left eye, which he could see through, but it obscured his vision. Pulled his eyelid down in the mirror, but continued to see the "cloud." Resolved in 5 minutes. Saw his ophthalmologist, Dr. Ch, the next day, who examined him and diagnosed amaurosis fugax. Sent to the ED for urgent stroke evaluation. No AC/AP. Denies prior history of TIA/stroke, but does report an episode of R sided facial hypoesthesia x 7 days last year. Questionable history of Aflutter, which led to a prescription for diltiazem.    ED vitals notable for: afebrile, HR 80s, BP to 145/85, RR 18-20, SpO2 95% on RA, NSR on telemetry. Labs notable for: unremarkable. CT imaging disclosed: no acute finding. Exam notable for nonfocal neurologic exam.    NIHSS on admission: 0  LKN: 4/3/2024, time unknown  pre-MRS: 0    Impression: Transient vision loss OS; c/f TIA - mechanism: ESUS or cardioembolic if truly carries a diagnosis of Aflutter (no significant stenosis of the b/l carotid arteries)    Recommendations:  [] Observe in CDU  [] Telemetry monitoring  [] Dysphagia screen  [] SBP goal <220  [] Start aspirin 81mg - if Aflutter/Afib is confirmed - will need full AC  [] Continue home rosuvastatin (titrate to goal LDL<70)  [] A1c, lipid panel  [] MR brain, MR orbits (to evaluate for non-vascular orbital pathology that could cause vision loss) w/wo  [] TTE with bubble study  [] ILR placement outpatient  [] Neurochecks, vitals q4h  [] Fall and aspiration precautions  [] PT/OT - no needs  [] Diet: DASH/TLC  [] DVT prophylaxis: enoxaparin 40mg q24h (unless medically contraindicated) AND SCDs  [] Stroke education provided  [] Smoking cessation education if patient is a current smoker  [] Please document MRS on discharge    NO tenecteplase d/t outside therapeutic window, symptoms resolved.  NO thrombectomy d/t no LVO.    To be seen by attending in the AM with attestation to follow. Recommendations were relayed directly to ED. Note delayed d/t emergent patient care.

## 2024-04-04 NOTE — ED ADULT NURSE NOTE - NSFALLUNIVINTERV_ED_ALL_ED
Bed/Stretcher in lowest position, wheels locked, appropriate side rails in place/Call bell, personal items and telephone in reach/Instruct patient to call for assistance before getting out of bed/chair/stretcher/Non-slip footwear applied when patient is off stretcher/[place] to call system/Physically safe environment - no spills, clutter or unnecessary equipment/Purposeful proactive rounding/Room/bathroom lighting operational, light cord in reach

## 2024-04-04 NOTE — ED CDU PROVIDER INITIAL DAY NOTE - OBJECTIVE STATEMENT
64 yo M with a PMH of HTN, HLD p/w transient va loss of the left eye yesterday. Pt states yesterday while on the computer his vision became "hazy" and then noticed a curtain come across his vision in the left eye. Lasted about 5 minutes and spontaneously resolved. Has never had before and has not had again. Went to see Ophthalmology today, Dr. Kaiser who referred pt to ED for eval of Amaurosis Fugax. Denies eye pain/trauma, floaters, blurred/double va, headache, dizziness, changes in speech, paresthesias. 64 yo M with a PMH of HTN, HLD p/w visual disturbance to the left eye yesterday. Pt states yesterday while on the computer he initially felt a "zing of dizziness" that lasted a "nanosecond." After this sensation of dizziness his vision became "hazy" and then noticed a "gray veil" come across his vision in the lower part of his left eye. Was able to see through this veil but vision was not clear. Lasted about 5 minutes and spontaneously resolved. Has never had before and has not had again. Went to see Ophthalmology today, Dr. Kaiser who referred pt to ED for eval of Amaurosis Fugax. Denies eye pain/trauma, floaters, blurred/double va, headache, changes in speech, paresthesias. Of note, pt wears contact lenses. Does not usually wear on in the left eye. Did not have his contact in the left eye at time of sxs.

## 2024-04-04 NOTE — ED CDU PROVIDER DISPOSITION NOTE - PATIENT PORTAL LINK FT
You can access the FollowMyHealth Patient Portal offered by [facility] by registering at the following website: [email or web address]. By joining [facility]’s FollowMyHealth portal, you will also be able to view your health information using other applications (apps) compatible with our system.

## 2024-04-04 NOTE — ED PROVIDER NOTE - OBJECTIVE STATEMENT
63-year-old F patient with history HTN, HLD, palpitations on diltiazem, GERD, presenting to the ED from ophthalmologist office for evaluation of amaurosis fugax yesterday.  Patient states he was sitting at his computer yesterday afternoon when he suddenly had visual changes, felt like there was a curtain coming up from his lower visual field on the left eye, the curtain was blurry vision and the rest of his vision was normal, this lasted for around 3 minutes and then completely resolved.  He notes normal vision and is asymptomatic since then.  He went to the eye doctor today, and was told to come into the ED for further evaluation.  Patient follows with cardiologist Dr. Jemal Crenshaw, last stress test and echo was a proximal 1 year ago, has had previous ultrasound carotid.  Denies fevers, chills, chest pain, shortness of breath, nausea, vomiting, diarrhea, abdominal pain, headaches, numbness, tingling, dizziness, lightheadedness, LOC.

## 2024-04-04 NOTE — ED PROVIDER NOTE - CLINICAL SUMMARY MEDICAL DECISION MAKING FREE TEXT BOX
This is a 62-year-old gentleman with a history HTN, HLD presenting for evaluation of vision changes yesterday, left eye with a 3-minute episode of blurred vision curtain over his lower visual field, completely resolving afterwards, currently asymptomatic, sent in by his ophthalmologist for evaluation, asymptomatic and well-appearing in the ED, normal neuroexam, will check CT scan of the head to evaluate for infarction or bleed, follow-up results and reassess, patient likely to stay in the ED for continued cardiac evaluation and possible MRIs and neurology follow-up. This is a 62-year-old gentleman with a history HTN, HLD presenting for evaluation of vision changes yesterday, left eye with a 3-minute episode of blurred vision curtain over his lower visual field, completely resolving afterwards, currently asymptomatic, sent in by his ophthalmologist for evaluation, asymptomatic and well-appearing in the ED, normal neuroexam, will check CT scan of the head to evaluate for infarction or bleed, follow-up results and reassess, patient likely to stay in the ED for continued cardiac evaluation and possible MRIs and neurology follow-up.  RGUJRAL 63-year-old male history of hyperlipidemia hypertension questionable history of a flutter on diltiazem no anticoagulation presents with an episode of visual disturbance in his left eye yesterday.  Patient states he felt lightheaded and felt a gray cloud in his left eye coming from the bottom to the top episode lasted about 5 minutes and resolved.  Patient followed up as a status ophthalmologist today and had a dilated exam that was normal and sent him to the hospital to evaluate for amaurosis fugax.  Patient denies any chest pain palpitations fevers chills or recurring symptoms.  On exam patient is well-appearing no acute distress left pupil currently dilated.  Extraocular muscles intact CN 2 through 12 intact 5 out of 5 upper and lower extremity with stable gait.  Positive S1-S2 lungs clear to auscultation bilaterally abdomen soft nontender.  Patient results reviewed including CTA.  Case reviewed with neurology recommended MRI and echo in CDU with telemetry monitoring.  Plan discussed with patient and wife at bedside.

## 2024-04-04 NOTE — ED ADULT NURSE NOTE - OBJECTIVE STATEMENT
64y/o M coming to the ED d/t L eye vision change. Pt states that she was on the computer yesterday when he had a sudden onset of a "cloudy veil" covering his eye that lasted 5min and resolved. Pt states went to see opthalmologist Dr. Kaiser and was told to come to the ED for Amaurosis Fugax of the left eye. On exam PT A&Ox3, neg facial drop, neg slurred speech, PERRL, denies blurred vision, equal strength and sensations in all extremities. Denies numbness, tingling, or weakness. IV placed, labs collected and sent.

## 2024-04-04 NOTE — ED CDU PROVIDER DISPOSITION NOTE - NSFOLLOWUPINSTRUCTIONS_ED_ALL_ED_FT
Please make sure to follow up with your primary care doctor within 1-2 days and with the NEUROLOGY AND OPHTHALMOLOGY specialist. The information for follow up can be found below. Bring a copy of all of your results with you to your follow up appointments.   Return to the ER as discussed if you develop any new or worsening symptoms.    OPHTHALMOLOGY   Dr. Gita Kaiser  Phone: [phone]    [facility]  Neurology  [address] [place] [address]  Phone: [phone] 1. Follow up with your PCP within 2-3 days. Please bring all results with you to your appointment.   2. Please follow up with ophtho within 2-3 days. Call to schedule an appointment. Dr. Ch at [facility].  3. Please follow up with neurology within 2-3 days. Call to schedule your appointment.     Milo Salgado MD  Vascular Neurology  Office: [phone].     [facility]  Neurology  [address] [place] [address]  Phone: [phone]    4. Continue taking all home medications. Begin taking Aspirin 81 mg daily. Begin taking Plavix 75 mg daily for 3 weeks.     5. Return to the emergency department immediately if you have worsening pain, dizziness, headaches, visual changes, difficulty breathing or all other concerning symptoms.

## 2024-04-04 NOTE — CONSULT NOTE ADULT - ATTENDING COMMENTS
DOS 4/5  Briefly    63y RIGHT handed man, with   HTN, HLD, class III obesity, who presents to [facility] ED on 4/4/2024, at the behest of his ophthalmologist, with c/o transient vision loss in his LEFT eye. On 4/3/2024 (day PTA) - patient was sitting at his computer. Had a quick sensation of dizziness, before his vision went hazy. Says he saw a "gray cloud" travel up his left eye, which he could see through, but it obscured his vision. Pulled his eyelid down in the mirror, but continued to see the "cloud." Resolved in 5 minutes. Saw his ophthalmologist, Dr. Ch, the next day, who examined him and diagnosed amaurosis fugax. Sent to the ED for urgent stroke evaluation. No AC/AP. Denies prior history of TIA/stroke, but does report an episode of R sided facial hypoesthesia x 7 days last year. Questionable history of Aflutter, which led to a prescription for diltiazem.    ED vitals notable for: afebrile, HR 80s, BP to 145/85, RR 18-20, SpO2 95% on RA, NSR on telemetry. Labs notable for: unremarkable.   neuro exam unremarkable   NIHSS on admission: 0  LKN: 4/3/2024, time unknown  pre-MRS: 0  CTH neg   CTA H/N neg       Impression: Transient vision loss OS; c/f TIA vs stroke     Recommendations:  - Dual antiplatelet therapy with ASA 81mg PO daily and Clopidogrel 75mg PO daily x 3 weeks followed by monotherapy with ASA 81mg PO daily.  - High dose statin therapyon rosuvastatin at home. LDL goal <70mg/dL.  - MRI brain w/o f/u   - Hemoglobin A1c and lipid panel  - TTE if MRI shows + stroke should be done inpateint otherwise outpatient is sufficient   - telemetry  - PT/OT/SS/SLP, OOBC  - normotension  - if + Afib would stop DAPT and place on AC   - check FS, glucose control <180  - GI/DVT ppx  - Counseling on diet, exercise, and medication adherence was done  - Counseling on smoking cessation and alcohol consumption offered when appropriate.  - Pain assessed and judicious use of narcotics when appropriate was discussed.    - Stroke education given when appropriate.  - Importance of fall prevention discussed.   - Differential diagnosis and plan of care discussed with patient and/or family and primary team  - Thank you for allowing me to participate in the care of this patient. Call with questions.     Milo Salgado MD  Vascular Neurology  Office: [phone] DOS 4/5  Briefly    63y RIGHT handed man, with   HTN, HLD, class III obesity, who presents to [facility] ED on 4/4/2024, at the behest of his ophthalmologist, with c/o transient vision loss in his LEFT eye. On 4/3/2024 (day PTA) - patient was sitting at his computer. Had a quick sensation of dizziness, before his vision went hazy. Says he saw a "gray cloud" travel up his left eye, which he could see through, but it obscured his vision. Pulled his eyelid down in the mirror, but continued to see the "cloud." Resolved in 5 minutes. Saw his ophthalmologist, Dr. Ch, the next day, who examined him and diagnosed amaurosis fugax. Sent to the ED for urgent stroke evaluation. No AC/AP. Denies prior history of TIA/stroke, but does report an episode of R sided facial hypoesthesia x 7 days last year. Questionable history of Aflutter, which led to a prescription for diltiazem.    ED vitals notable for: afebrile, HR 80s, BP to 145/85, RR 18-20, SpO2 95% on RA, NSR on telemetry. Labs notable for: unremarkable.   neuro exam unremarkable   NIHSS on admission: 0  LKN: 4/3/2024, time unknown  pre-MRS: 0  CTH neg   CTA H/N neg       Impression: Transient vision loss OS; c/f TIA vs stroke     Recommendations:  - Dual antiplatelet therapy with ASA 81mg PO daily and Clopidogrel 75mg PO daily x 3 weeks followed by monotherapy with ASA 81mg PO daily.  - High dose statin therapyon rosuvastatin at home. LDL goal <70mg/dL.  - MRI brain w/o f/u   - Hemoglobin A1c and lipid panel  - TTE if MRI shows + stroke should be done inpateint otherwise outpatient is sufficient   - telemetry  - PT/OT/SS/SLP, OOBC  - normotension  - if + Afib would stop DAPT and place on AC   - check FS, glucose control <180  - GI/DVT ppx  - Counseling on diet, exercise, and medication adherence was done  - Counseling on smoking cessation and alcohol consumption offered when appropriate.  - Pain assessed and judicious use of narcotics when appropriate was discussed.    - Stroke education given when appropriate.  - Importance of fall prevention discussed.   - Differential diagnosis and plan of care discussed with patient and/or family and primary team  - Thank you for allowing me to participate in the care of this patient. Call with questions.     Milo Salgado MD  Vascular Neurology  Office: [phone] Addendum: MRI brain and orbits neg.  recs as above   outpatient fu. no objection to dc  Milo Salgado MD  Vascular Neurology  Office: [phone]

## 2024-04-04 NOTE — ED ADULT TRIAGE NOTE - CHIEF COMPLAINT QUOTE
Pt c/o "1 episode yesterday with cloudy vision to left eye that lasted about 5 min. Saw Optomologist today who sent me in for further evaluation. No vision disturbances now"

## 2024-04-04 NOTE — ED CDU PROVIDER DISPOSITION NOTE - CLINICAL COURSE
64 yo M with a PMH of HTN, HLD p/w transient va loss of the left eye yesterday. Pt states yesterday while on the computer his vision became "hazy" and then noticed a curtain come across his vision in the left eye. Lasted about 5 minutes and spontaneously resolved. Has never had before and has not had again. Went to see Ophthalmology today, Dr. Kaiser who referred pt to ED for eval of Amaurosis Fugax. Denies eye pain/trauma, floaters, blurred/double va, headache, dizziness, changes in speech, paresthesias.  In the ED, pt with stable VS. No complaints. Labs non actionable, trop stable x2. CTH/CTAs w/o acute pathology. Pt seen by Neuro, recommending CDU for MRIs to eval presenting sxs.  In the CDU** 62 yo M with a PMH of HTN, HLD p/w visual disturbance to the left eye yesterday. Pt states yesterday while on the computer he initially felt a "zing of dizziness" that lasted a "nanosecond." After this sensation of dizziness his vision became "hazy" and then noticed a "gray veil" come across his vision in the lower part of his left eye. Was able to see through this veil but vision was not clear. Lasted about 5 minutes and spontaneously resolved. Has never had before and has not had again. Went to see Ophthalmology today, Dr. Kaiser who referred pt to ED for eval of Amaurosis Fugax. Denies eye pain/trauma, floaters, blurred/double va, headache, changes in speech, paresthesias. Of note, pt wears contact lenses. Does not usually wear on in the left eye. Did not have his contact in the left eye at time of sxs.   In the ED, pt with stable VS. No complaints. Labs non actionable, trop stable x2. CTH/CTAs w/o acute pathology. Pt seen by Neuro, recommending CDU for MRIs to eval presenting sxs.  In the CDU** 62 yo M with a PMH of HTN, HLD p/w visual disturbance to the left eye yesterday. Pt states yesterday while on the computer he initially felt a "zing of dizziness" that lasted a "nanosecond." After this sensation of dizziness his vision became "hazy" and then noticed a "gray veil" come across his vision in the lower part of his left eye. Was able to see through this veil but vision was not clear. Lasted about 5 minutes and spontaneously resolved. Has never had before and has not had again. Went to see Ophthalmology today, Dr. Kaiser who referred pt to ED for eval of Amaurosis Fugax. Denies eye pain/trauma, floaters, blurred/double va, headache, changes in speech, paresthesias. Of note, pt wears contact lenses. Does not usually wear on in the left eye. Did not have his contact in the left eye at time of sxs.   In the ED, pt with stable VS. No complaints. Labs non actionable, trop stable x2. CTH/CTAs w/o acute pathology. Pt seen by Neuro, recommending CDU for MRIs to eval presenting sxs.  In the CDU patient had MRI and echo which revealed no acute findings. Neuro evaluated. Stable for dc home.

## 2024-04-04 NOTE — CONSULT NOTE ADULT - SUBJECTIVE AND OBJECTIVE BOX
Neurology - Consult Note    -  Spectra: [phone] ([facility]), [phone] ([facility]). For new consults, please page: [phone] ([facility]), [phone] ([facility]).  -    HPI: Patient EDOUARD GONZALES is a 63y (1960) *** handed wo/man who presents to *** ED on ***, with c/o ***.    PMH significant for: ***    Review of Systems:  INCOMPLETE   All other review of systems is negative unless indicated above.    Allergies:  No Known Allergies      PMHx/PSHx/Family Hx: As above, otherwise see below   No pertinent past medical history    HTN (hypertension)    HLD (hyperlipidemia)        Social Hx:  Per HPI    Medications:  MEDICATIONS  (STANDING):    MEDICATIONS  (PRN):      Vitals:  T(C): 36.9 (04-04-24 @ 22:12), Max: 36.9 (04-04-24 @ 22:12)  HR: 80 (04-04-24 @ 22:12) (80 - 87)  BP: 138/91 (04-04-24 @ 22:12) (138/91 - 145/85)  RR: 18 (04-04-24 @ 22:12) (18 - 20)  SpO2: 95% (04-04-24 @ 22:12) (95% - 95%)    Physical Examination: INCOMPLETE  General - Sitting up on ED cart  Cardiovascular - No LE edema  Eyes - Fundoscopy not performed due to safety precautions in the setting of infection risk, non-injected conjunctivae, anicteric sclerae    Neurologic Exam:  Mental status:  - Awake, Alert  - Oriented to: person, place, and time  - Speech: fluent  - Repetition and naming: intact   - Follows simple and complex commands   - Attention/concentration: intact  - Recent and remote memory (including registration and recall): registration intact, 3/3 on 3-word recall  - Fund of knowledge: intact    Cranial nerves - PERRL, VFF on confrontational testing, EOMI - no nystagmus, face sensation (V1-V3) intact b/l, facial strength intact without asymmetry b/l, hearing intact b/l with finger rub test, palate with symmetric elevation, shoulder shrug intact b/l, tongue midline on protrusion with full lateral movement  Dysarthria: ***    Motor - Normal bulk and tone throughout. No pronator drift.  Strength testing (R/L)  Deltoid:  5/5  Biceps:  5/5        Triceps:  5/5       Wrist Extension:  5/5      Wrist Flexion:  5/5       Interossei:  5/5        :  5/5    Hip Flexion:  5/5  Hip Extension:  5/5      Knee Flexion:  5/5      Knee Extension:  5/5      Dorsiflexion:  5/5      Plantar Flexion:  5/5    Sensation - Light touch/vibration intact throughout    DTRs (R/L)  Biceps:  2+/2+        Triceps:  2+/2+       Brachioradialis:  2+/2+        Patellar:  2+/2+      Ankle:  2+/2+      Plantar response:  Down/Down    Coordination - Finger to Nose, Heel to Shin intact b/l. No tremors appreciated.    Gait and station - Unable to assess d/t fall risk/safety concerns.    Labs:                        15.8   7.06  )-----------( 195      ( 04 Apr 2024 18:23 )             47.5     04-04    140  |  100  |  15  ----------------------------<  109<H>  4.6   |  28  |  1.24    Ca    10.5      04 Apr 2024 18:23    TPro  7.7  /  Alb  4.5  /  TBili  0.7  /  DBili  x   /  AST  25  /  ALT  28  /  AlkPhos  46  04-04    CAPILLARY BLOOD GLUCOSE        LIVER FUNCTIONS - ( 04 Apr 2024 18:23 )  Alb: 4.5 g/dL / Pro: 7.7 g/dL / ALK PHOS: 46 U/L / ALT: 28 U/L / AST: 25 U/L / GGT: x             PT/INR - ( 04 Apr 2024 18:23 )   PT: 11.2 sec;   INR: 1.07 ratio         PTT - ( 04 Apr 2024 18:23 )  PTT:28.1 sec  CSF:                  Radiology:  CT Head No Cont:  (04 Apr 2024 20:26)     Neurology - Consult Note    -  Spectra: [phone] ([facility]), [phone] ([facility]). For new consults, please page: [phone] ([facility]), [phone] ([facility]).  -    HPI: Patient EDOUARD GONZALES is a 63y (1960) RIGHT handed man who presents to [facility] ED on 4/4/2024, at the behest of his ophthalmologist, with c/o transient vision loss in his LEFT eye.    PMH significant for: HTN, HLD, class III obesity    Patient reporting to the emergency department at the behest of Dr. Garcia, his ophthalmologist.  Dr. Garcia had sent patient in for stroke workup. The ED called to inform me of patient and had already recommended he stay in the CDU for MRI and TTE.  However, patient requesting that he be discharged from the emergency department. I did advise the emergency department that patient should be signed out AGAINST MEDICAL ADVICE if he were to choose to leave prior to obtaining workup in the CDU.  Patient then agreed to stay overnight.    Patient is accompanied by his wife.  Patient reports that yesterday he was sitting at the computer.  He says he felt a "zing" of dizziness.  He says his vision went hazy first and then a "gray fog" traveled up his LEFT eye.  He says it looked like a cloud.  He says he could see through it but it was in his field of vision.  He says he went to the bathroom and pulled his eye down and was still experiencing this visual disturbance. The entire episode lasted 5 minutes.  There was no pain in his eye.  Denies any trauma to his eye.  There was no associated headache.  Patient went to Dr. Garcia's office who did a full exam and diagnosed him with amaurosis fugax. No records available to review. Patient was sent to the emergency department for stroke workup.  Patient underwent a CT head and CTA head neck, without significant finding.    Patient tells me that he has a diagnosis of hypertension for which he takes valsartan and diltiazem.  He is also on rosuvastatin for hyperlipidemia.  He denies a history of type 2 diabetes mellitus.  Patient denies a history of stroke or MI.  Patient is not on any blood thinners or aspirin prior to arrival.  Patient does not use any assistive devices at baseline.  He reports that he is able to climb stairs.  Patient drives.  Patient reports that he smokes cigars every weekend.  Smoking cessation counseling was provided.  Patient reports that he drinks 1-2 alcoholic drinks per day. Wife corrects him and says "you drink 2-3 drinks per day."  Alcohol cessation counseling provided.  Patient denies recreational drug use.  Patient currently lives with his wife.  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  Patient also reports that he wears contacts.  He was not wearing his contacts when the symptoms arose.   Also of note, patient has a questionable history of atrial flutter.  Per patient, he went to the cardiologist with palpitations.  The atrial flutter was reportedly never caught on EEG.  However, patient was started on diltiazem for atrial flutter.   Patient tells me that in September 2023, he experienced right sided facial hypoesthesia for 7 days.  He went to the ENT because he thought he had sinusitis.  There was no finding of sinusitis on exam.    Patient denies fevers, chills, blurry vision, diplopia, headache, neck pain, chest pain, palpitations, abdominal pain, nausea/vomiting, diarrhea, dysuria.  He denies any intolerance to aspirin.    Review of Systems:  All other review of systems is negative unless indicated above.    Allergies:  No Known Allergies      PMHx/PSHx/Family Hx: As above, otherwise see below   No pertinent past medical history    HTN (hypertension)    HLD (hyperlipidemia)        Social Hx:  Per HPI    Medications:  MEDICATIONS  (STANDING):    MEDICATIONS  (PRN):      Vitals:  T(C): 36.9 (04-04-24 @ 22:12), Max: 36.9 (04-04-24 @ 22:12)  HR: 80 (04-04-24 @ 22:12) (80 - 87)  BP: 138/91 (04-04-24 @ 22:12) (138/91 - 145/85)  RR: 18 (04-04-24 @ 22:12) (18 - 20)  SpO2: 95% (04-04-24 @ 22:12) (95% - 95%)    Physical Examination:  General - Sitting up on ED cart, appears older than stated age, morbidly obese  Eyes - Mildly injected conjunctivae, anicteric sclerae    Neurologic Exam:  Mental status:  - Awake, Alert  - Oriented to: person, place, and time  - Speech: fluent  - Repetition and naming: intact   - Follows simple and complex commands   - Fund of knowledge: intact    Cranial nerves - PERRL (L>R in size), VFF on confrontational testing, EOMI - no nystagmus, face sensation (V1-V3) intact b/l, facial strength intact without asymmetry b/l, hearing intact b/l with finger rub test - wearing hearing aids, palate with symmetric elevation, shoulder shrug intact b/l, tongue midline on protrusion with full lateral movement  Dysarthria: not present    Motor - Normal bulk and tone throughout. No pronator drift.  Strength testing (R/L)  Deltoid:  5/5  Biceps:  5/5        Triceps:  5/5       Wrist Extension:  5/5      Wrist Flexion:  5/5       Interossei:  5/5        :  5/5    Hip Flexion:  5/5  Hip Extension:  5/5      Knee Flexion:  5/5      Knee Extension:  5/5      Dorsiflexion:  5/5      Plantar Flexion:  5/5    Sensation - Light touch intact throughout    DTRs (R/L)  Deferred d/t focused neurologic exam    Coordination - Finger to Nose, Heel to Shin intact b/l. No tremors appreciated.    Gait and station - Unable to assess d/t fall risk/safety concerns.    Labs:                        15.8   7.06  )-----------( 195      ( 04 Apr 2024 18:23 )             47.5     04-04    140  |  100  |  15  ----------------------------<  109<H>  4.6   |  28  |  1.24    Ca    10.5      04 Apr 2024 18:23    TPro  7.7  /  Alb  4.5  /  TBili  0.7  /  DBili  x   /  AST  25  /  ALT  28  /  AlkPhos  46  04-04    CAPILLARY BLOOD GLUCOSE        LIVER FUNCTIONS - ( 04 Apr 2024 18:23 )  Alb: 4.5 g/dL / Pro: 7.7 g/dL / ALK PHOS: 46 U/L / ALT: 28 U/L / AST: 25 U/L / GGT: x             PT/INR - ( 04 Apr 2024 18:23 )   PT: 11.2 sec;   INR: 1.07 ratio         PTT - ( 04 Apr 2024 18:23 )  PTT:28.1 sec  CSF:                  Radiology:  CT ANGIO BRAIN (W)AW IC  CT ANGIO NECK (W)AW IC  CT BRAIN    PROCEDURE DATE:  04/04/2024      INTERPRETATION:  Exam Date: 4/4/2024 8:26 PM    Three examinations were performed on this patient:  1. CT Angiography of the carotid arteries with and without IV contrast  2. CT Angiography of the intracranial circulation with and without IV   contrast      CLINICAL INFORMATION:  Amaurosis Fugax left eye    TECHNIQUE:   Preceding intravenous contrast contiguous axial 4 mm   sections were obtained through the head. CT angiography images were   acquired from the aortic arch to the vertex of the skull.   Images were   acquired during rapid bolus intravenous administration of 90 mL of   Omnipaque 350 contrast with 10 mL discarded.  3D, coronal, and sagittal   reformats were obtained.    COMPARISON:     none    FINDINGS:    The brain demonstrates no acute cerebral cortical infarct is seen. No   acute hemorrhage is present.   No evidence of midline shift.  The   ventricles, sulci and basal cisterns appear unremarkable.    The paranasal sinuses and mastoid air cells are clear.    Intracranial vertebral arteries are intact without evidence of dissection   or hemodynamically significant stenosis. The basilar artery and posterior   cerebral arteries and are normal without hemodynamically significant   stenosis. Bilateral superior cerebellar arteries are intact. The   intracranial internal carotid arteries, middle cerebral arteries, and   anterior cerebral arteries are intact without hemodynamically significant   stenosis.  There is no evidence of aneurysm or vascular malformation.    The carotid circulation is intact without hemodynamically significant   stenosis.   The vertebral arteries are patent.      IMPRESSION:        1.   Brain:  Unremarkable        2.   Intracranial circulation:  No hemodynamically significant   stenosis.        3.    Right carotid/vertebral artery system:  No hemodynamically   significant stenosis.        4.   Left carotid/vertebral artery system:  No hemodynamically   significant stenosis.

## 2024-04-04 NOTE — ED PROVIDER NOTE - RAPID ASSESSMENT
63-year-old male sent in from ophthalmologist Dr. Kaiser for Amaurosis Fugax of the left eye.  Patient had the symptoms yesterday felt a curtain come up on his vision lasted about 5 minutes and spontaneously resolved.  Saw the ophthalmologist today that sent him in.  Patient was rapidly assessed via a telemedicine and/or role of Quick Triage Doctor; a limited history, physical exam and assessment was performed. The patient will be seen and further evaluated in the main emergency department. The remainder of care and evaluation will be conducted by the primary emergency medicine team. Receiving team will follow up on labs, imaging and serially reassess patient as indicated. All further decisions regarding patient care, evaluation and disposition are at the discretion of the receiving primary emergency department team.

## 2024-04-05 ENCOUNTER — RESULT REVIEW (OUTPATIENT)
Age: 64
End: 2024-04-05

## 2024-04-05 VITALS
OXYGEN SATURATION: 95 % | HEART RATE: 84 BPM | DIASTOLIC BLOOD PRESSURE: 88 MMHG | SYSTOLIC BLOOD PRESSURE: 130 MMHG | TEMPERATURE: 98 F | RESPIRATION RATE: 16 BRPM

## 2024-04-05 PROCEDURE — C8929: CPT

## 2024-04-05 PROCEDURE — G0378: CPT

## 2024-04-05 PROCEDURE — 36000 PLACE NEEDLE IN VEIN: CPT | Mod: XU

## 2024-04-05 PROCEDURE — 93005 ELECTROCARDIOGRAM TRACING: CPT

## 2024-04-05 PROCEDURE — 70553 MRI BRAIN STEM W/O & W/DYE: CPT | Mod: 26,MC

## 2024-04-05 PROCEDURE — 99238 HOSP IP/OBS DSCHRG MGMT 30/<: CPT

## 2024-04-05 PROCEDURE — 80053 COMPREHEN METABOLIC PANEL: CPT

## 2024-04-05 PROCEDURE — 70498 CT ANGIOGRAPHY NECK: CPT | Mod: MC

## 2024-04-05 PROCEDURE — 85025 COMPLETE CBC W/AUTO DIFF WBC: CPT

## 2024-04-05 PROCEDURE — 85610 PROTHROMBIN TIME: CPT

## 2024-04-05 PROCEDURE — 93306 TTE W/DOPPLER COMPLETE: CPT | Mod: 26

## 2024-04-05 PROCEDURE — 70496 CT ANGIOGRAPHY HEAD: CPT | Mod: MC

## 2024-04-05 PROCEDURE — A9585: CPT

## 2024-04-05 PROCEDURE — 84484 ASSAY OF TROPONIN QUANT: CPT

## 2024-04-05 PROCEDURE — 70543 MRI ORBT/FAC/NCK W/O &W/DYE: CPT | Mod: MC

## 2024-04-05 PROCEDURE — 85730 THROMBOPLASTIN TIME PARTIAL: CPT

## 2024-04-05 PROCEDURE — 70450 CT HEAD/BRAIN W/O DYE: CPT | Mod: MC

## 2024-04-05 PROCEDURE — 70543 MRI ORBT/FAC/NCK W/O &W/DYE: CPT | Mod: 26,MC

## 2024-04-05 PROCEDURE — 70553 MRI BRAIN STEM W/O & W/DYE: CPT | Mod: MC

## 2024-04-05 PROCEDURE — 99285 EMERGENCY DEPT VISIT HI MDM: CPT | Mod: 25

## 2024-04-05 RX ORDER — CLOPIDOGREL BISULFATE 75 MG/1
1 TABLET, FILM COATED ORAL
Qty: 30 | Refills: 0
Start: 2024-04-05 | End: 2024-05-04

## 2024-04-05 RX ORDER — ASPIRIN/CALCIUM CARB/MAGNESIUM 324 MG
1 TABLET ORAL
Qty: 30 | Refills: 0
Start: 2024-04-05 | End: 2024-05-04

## 2024-04-05 RX ADMIN — Medication 81 MILLIGRAM(S): at 12:37

## 2024-04-05 NOTE — ED CDU PROVIDER SUBSEQUENT DAY NOTE - HISTORY
Patient seen at bedside in NAD.  VSS.  Upon entering room pt was sleeping, denies any complaints. No changes in vision thus far. Pending MRI/ECHO and neuro reassessment in AM. Traci Ramos PA-C

## 2024-04-05 NOTE — ED CDU PROVIDER SUBSEQUENT DAY NOTE - PHYSICAL EXAMINATION
CONSTITUTIONAL: Well appearing and in no apparent distress.  ENT: Airway patent, moist mucous membranes.   EYES: Pupils equal, round and reactive to light. EOMI. Conjunctiva normal appearing. Mild ptosis of L lid, pt and wife at bedside state this is chronic from injury as a child with a hockey stick.   CARDIAC: Normal rate, regular rhythm.  Heart sounds S1, S2.    RESPIRATORY: Breath sounds clear and equal bilaterally.   GASTROINTESTINAL: Abdomen soft, non-tender, not distended.  MUSCULOSKELETAL: Spine appears normal.  NEUROLOGICAL: Alert and oriented x3, no focal deficits, no motor or sensory deficits. 5/5 muscle strength throughout. Non focal.   SKIN: Skin normal color, warm, dry and intact.   PSYCHIATRIC: Normal mood and affect.

## 2024-04-05 NOTE — ED CDU PROVIDER SUBSEQUENT DAY NOTE - PROGRESS NOTE DETAILS
Patient evaluated at bedside. NAD. VSS. Neuro exam unremarkable. MRI resulted no acute infarcts. ECHO normal EF no regional wall motion abnormalities. Neuro evaluated at bedside. No contraindication for discharge at this time. Recc d/c with ASA and Plavix for three weeks. Discussed all results and recommendations with patient and wife at bedside. Answered all questions/concerns. CDU attending Dr. Sanchez evaluated at bedside. Stable for discharge home. Sommer José PA-C

## 2024-04-05 NOTE — CHART NOTE - NSCHARTNOTEFT_GEN_A_CORE
Pt sent in to [facility] ED by Dr. Tyrone PHILLIPS [facility] with suspicion of Amaurosis Fugax. Sent in for stroke work-up due to high risk of amaurosis given HLD and other comorbidities.     Eye Exam at outpatient office visit 4/4/24 below:     Va 20/20 OU cc  PERRL no rapd   CVF Full  EOM Full  Adnexa Normal  Lids- Floppy Eyelids   Conjunctiva White and Quiet OU  Corneal Clear OU  Iris Flat OU  Anterior Chamber Deep and Quiet OU  Lens 2+ NS OU  Dilated Fundus Exam within normal limits OU  Optic Disc Flat sharp, no emboli OU  Macula Flat OU  Periphery Flat OU  Vessels normal OU.     After discharge patient will [facility].   Can also provide with [facility] Information as back up.

## 2024-05-22 ENCOUNTER — APPOINTMENT (OUTPATIENT)
Dept: ORTHOPEDIC SURGERY | Facility: CLINIC | Age: 64
End: 2024-05-22
Payer: COMMERCIAL

## 2024-05-22 VITALS — BODY MASS INDEX: 41.31 KG/M2 | WEIGHT: 305 LBS | HEIGHT: 72 IN

## 2024-05-22 DIAGNOSIS — R26.81 UNSTEADINESS ON FEET: ICD-10-CM

## 2024-05-22 PROCEDURE — 99213 OFFICE O/P EST LOW 20 MIN: CPT

## 2024-05-22 NOTE — PHYSICAL EXAM
[Right] : right foot and ankle [Mild] : mild swelling over achilles tendon [NL (40)] : plantar flexion 40 degrees [5___] : plantar flexion 5[unfilled]/5 [2+] : dorsalis pedis pulse: 2+ [] : mildly antalgic [FreeTextEntry8] : improved [de-identified] : normal bullard [TWNoteComboBox7] : dorsiflexion 15 degrees

## 2024-05-22 NOTE — ASSESSMENT
[FreeTextEntry1] : wbat continue PT for gait instability playing golf nsaids prn f/up after PT if not resolved

## 2024-05-22 NOTE — HISTORY OF PRESENT ILLNESS
[Constant] : constant [de-identified] : 02/14/2024: 1.5 wks achilles pain. denies specific injury. no prior ankle probs. no tx to date. denies dm/tob.   03/27/2024:  improving. going to PT. not using brace on instruction of PT  05/22/2024: improving. walking in regular shoes. going to PT. requesting new PT rx. feells 80% better [] : Post Surgical Visit: no [FreeTextEntry1] : CARINA Magaña

## 2024-06-20 PROBLEM — I10 ESSENTIAL (PRIMARY) HYPERTENSION: Chronic | Status: ACTIVE | Noted: 2024-04-04

## 2024-06-20 PROBLEM — E78.5 HYPERLIPIDEMIA, UNSPECIFIED: Chronic | Status: ACTIVE | Noted: 2024-04-04

## 2024-06-21 ENCOUNTER — APPOINTMENT (OUTPATIENT)
Dept: ORTHOPEDIC SURGERY | Facility: CLINIC | Age: 64
End: 2024-06-21
Payer: COMMERCIAL

## 2024-06-21 VITALS — HEIGHT: 72 IN | WEIGHT: 305 LBS | BODY MASS INDEX: 41.31 KG/M2

## 2024-06-21 DIAGNOSIS — M94.252 CHONDROMALACIA, LEFT HIP: ICD-10-CM

## 2024-06-21 DIAGNOSIS — M17.11 UNILATERAL PRIMARY OSTEOARTHRITIS, RIGHT KNEE: ICD-10-CM

## 2024-06-21 DIAGNOSIS — M17.12 UNILATERAL PRIMARY OSTEOARTHRITIS, LEFT KNEE: ICD-10-CM

## 2024-06-21 DIAGNOSIS — M65.9 SYNOVITIS AND TENOSYNOVITIS, UNSPECIFIED: ICD-10-CM

## 2024-06-21 PROCEDURE — 99213 OFFICE O/P EST LOW 20 MIN: CPT

## 2024-06-21 PROCEDURE — 73502 X-RAY EXAM HIP UNI 2-3 VIEWS: CPT

## 2024-06-21 RX ORDER — PANTOPRAZOLE 40 MG/1
TABLET, DELAYED RELEASE ORAL
Refills: 0 | Status: ACTIVE | COMMUNITY

## 2024-06-21 RX ORDER — RIVAROXABAN 2.5 MG/1
TABLET, FILM COATED ORAL
Refills: 0 | Status: ACTIVE | COMMUNITY

## 2024-06-21 RX ORDER — LOSARTAN POTASSIUM 100 MG/1
TABLET, FILM COATED ORAL
Refills: 0 | Status: ACTIVE | COMMUNITY

## 2024-06-21 RX ORDER — ROSUVASTATIN CALCIUM 5 MG/1
TABLET, FILM COATED ORAL
Refills: 0 | Status: ACTIVE | COMMUNITY

## 2024-06-21 RX ORDER — METHYLPREDNISOLONE 4 MG/1
4 TABLET ORAL
Qty: 1 | Refills: 0 | Status: ACTIVE | COMMUNITY
Start: 2024-06-21 | End: 1900-01-01

## 2024-06-21 NOTE — HISTORY OF PRESENT ILLNESS
[Gradual] : gradual [0] : 0 [3] : 3 [Other:____] : [unfilled] [de-identified] : 7/13/22: This is Mr. EDOUARD GONZALES  a 62 year old male with bilateral long standing knee arthritis who comes in today complaining of bilateral knee pain.  Treated for several years with HA injections every 6 months. Has had excellent results and pain is now starting again. Wants to start Visco-3. Last series November 2021.  Hx of knee arthroscopy and CSI, PT in the past.   currently on  Xarelto  [] : no [FreeTextEntry6] : na [FreeTextEntry7] : na [FreeTextEntry8] : na [FreeTextEntry9] : na [de-identified] : na [de-identified] : none [de-identified] : 11/2/9/23 [de-identified] : john knees

## 2024-06-21 NOTE — IMAGING
[Bilateral] : knee bilaterally [All Views] : anteroposterior, lateral, skyline, and anteroposterior standing [Moderate tricompartmental OA medial narrowing] : Moderate tricompartmental OA medial narrowing [de-identified] : Bilateral knee:   Inspection:  min effusion no atrophy, skin intact  Palpation:  anterior and medial joint line tenderness  ROM:  Flexion 120 Ext 0   Strength: quadriceps 5/5 hamstrings 5/5  Ligament stability and special tests Wilber Flores (+) Ligament stable  Neuro: Light touch intact  Gait and function: assistive device (-)    ----------------------------------------------------------------------------   Left hip exam:   Inspection: no deformity, no swelling, no gross limb length discrepancy ROM:    Flexion: 100    ER: 50    IR: 20 Tenderness:    (neg) Groin tenderness    (neg) Greater trochanteric tenderness    (neg) Buttock tenderness    (neg) IT Band tenderness    (neg) Anterior thigh tenderness    (neg)ASIS tenderness    (neg) Ischial tuberosity    (neg) Hamstring muscle tenderness Additional tests:    (+) FADIR    (+) SMITHA    (neg) Resisted hip flexion pain    (neg) Apprehension (external rotation/extension)    (neg) Posterior pain with forced hip flexion and knee extension    (neg) Tight hamstrings    (neg) Log roll    (neg) Axial load Strength: 5/5 IP/Q/H/TA/GS/EHL Neuro: In tact to light touch throughout, DTR's wnl Vascularity: Extremity warm and well perfused Gait: antalgic       [Left] : left hip with pelvis [There are no fractures, subluxations or dislocations. No significant abnormalities are seen] : There are no fractures, subluxations or dislocations. No significant abnormalities are seen [Mild arthritis (Tonnis Grade 1)] : Mild arthritis (Tonnis Grade 1)

## 2024-06-21 NOTE — DISCUSSION/SUMMARY
[Medication Risks Reviewed] : Medication risks reviewed [de-identified] : Submit Appario  for B/L knee visco inj  Plan for Rx: MDP Plan for PT for L hip  ----------------------------------------------------------------------------  All relevant imaging studies pertinent to today's visit, including x-rays, MRI's and/or other advanced imaging studies (CT/etc) were independently interpreted and reviewed with the patient as needed. Implications of the studies together with the patient's clinical picture were discussed to formulate a working diagnosis and management options were detailed.  The patient was advised of the diagnosis.  The natural history of the pathology was explained in full. All questions were answered.  The risks and benefits of conservative and interventional treatment alternatives were explained to the patient   ----------------------------------------------------------------------------

## 2024-06-24 ENCOUNTER — APPOINTMENT (OUTPATIENT)
Dept: ORTHOPEDIC SURGERY | Facility: CLINIC | Age: 64
End: 2024-06-24
Payer: COMMERCIAL

## 2024-06-24 DIAGNOSIS — M76.60 ACHILLES TENDINITIS, UNSPECIFIED LEG: ICD-10-CM

## 2024-06-24 DIAGNOSIS — M76.61 ACHILLES TENDINITIS, RIGHT LEG: ICD-10-CM

## 2024-06-24 PROCEDURE — 99213 OFFICE O/P EST LOW 20 MIN: CPT

## 2024-06-24 PROCEDURE — 73600 X-RAY EXAM OF ANKLE: CPT | Mod: RT

## 2024-07-03 ENCOUNTER — APPOINTMENT (OUTPATIENT)
Dept: ORTHOPEDIC SURGERY | Facility: CLINIC | Age: 64
End: 2024-07-03
Payer: COMMERCIAL

## 2024-07-03 PROCEDURE — 20610 DRAIN/INJ JOINT/BURSA W/O US: CPT | Mod: 50

## 2024-07-03 PROCEDURE — 99024 POSTOP FOLLOW-UP VISIT: CPT

## 2024-07-11 ENCOUNTER — APPOINTMENT (OUTPATIENT)
Dept: ORTHOPEDIC SURGERY | Facility: CLINIC | Age: 64
End: 2024-07-11
Payer: COMMERCIAL

## 2024-07-11 VITALS — BODY MASS INDEX: 41.31 KG/M2 | WEIGHT: 305 LBS | HEIGHT: 72 IN

## 2024-07-11 PROCEDURE — 20611 DRAIN/INJ JOINT/BURSA W/US: CPT | Mod: 50

## 2024-07-11 RX ORDER — METHYLPREDNISOLONE 4 MG/1
4 TABLET ORAL
Qty: 1 | Refills: 0 | Status: ACTIVE | COMMUNITY
Start: 2024-07-11 | End: 1900-01-01

## 2024-07-17 ENCOUNTER — APPOINTMENT (OUTPATIENT)
Dept: ORTHOPEDIC SURGERY | Facility: CLINIC | Age: 64
End: 2024-07-17
Payer: COMMERCIAL

## 2024-07-17 DIAGNOSIS — M76.61 ACHILLES TENDINITIS, RIGHT LEG: ICD-10-CM

## 2024-07-17 PROCEDURE — 99213 OFFICE O/P EST LOW 20 MIN: CPT

## 2024-07-18 ENCOUNTER — APPOINTMENT (OUTPATIENT)
Dept: ORTHOPEDIC SURGERY | Facility: CLINIC | Age: 64
End: 2024-07-18
Payer: COMMERCIAL

## 2024-07-18 VITALS — HEIGHT: 72 IN | WEIGHT: 305 LBS | BODY MASS INDEX: 41.31 KG/M2

## 2024-07-18 DIAGNOSIS — M17.12 UNILATERAL PRIMARY OSTEOARTHRITIS, LEFT KNEE: ICD-10-CM

## 2024-07-18 DIAGNOSIS — M17.11 UNILATERAL PRIMARY OSTEOARTHRITIS, RIGHT KNEE: ICD-10-CM

## 2024-07-18 PROCEDURE — 20611 DRAIN/INJ JOINT/BURSA W/US: CPT | Mod: 50

## 2024-08-21 ENCOUNTER — APPOINTMENT (OUTPATIENT)
Dept: ORTHOPEDIC SURGERY | Facility: CLINIC | Age: 64
End: 2024-08-21

## 2024-10-08 ENCOUNTER — APPOINTMENT (OUTPATIENT)
Dept: PAIN MANAGEMENT | Facility: CLINIC | Age: 64
End: 2024-10-08
Payer: COMMERCIAL

## 2024-10-08 VITALS — WEIGHT: 310 LBS | BODY MASS INDEX: 41.99 KG/M2 | HEIGHT: 72 IN

## 2024-10-08 DIAGNOSIS — M54.50 LOW BACK PAIN, UNSPECIFIED: ICD-10-CM

## 2024-10-08 DIAGNOSIS — M54.17 RADICULOPATHY, LUMBOSACRAL REGION: ICD-10-CM

## 2024-10-08 PROCEDURE — J3490M: CUSTOM

## 2024-10-08 PROCEDURE — 99203 OFFICE O/P NEW LOW 30 MIN: CPT

## 2024-10-08 PROCEDURE — 99204 OFFICE O/P NEW MOD 45 MIN: CPT | Mod: 25

## 2024-10-08 PROCEDURE — 20552 NJX 1/MLT TRIGGER POINT 1/2: CPT

## 2024-10-09 ENCOUNTER — APPOINTMENT (OUTPATIENT)
Dept: MRI IMAGING | Facility: CLINIC | Age: 64
End: 2024-10-09
Payer: COMMERCIAL

## 2024-10-09 PROCEDURE — 72148 MRI LUMBAR SPINE W/O DYE: CPT

## 2024-10-22 ENCOUNTER — APPOINTMENT (OUTPATIENT)
Dept: PAIN MANAGEMENT | Facility: CLINIC | Age: 64
End: 2024-10-22
Payer: COMMERCIAL

## 2024-10-22 VITALS — BODY MASS INDEX: 41.99 KG/M2 | HEIGHT: 72 IN | WEIGHT: 310 LBS

## 2024-10-22 DIAGNOSIS — M54.50 LOW BACK PAIN, UNSPECIFIED: ICD-10-CM

## 2024-10-22 DIAGNOSIS — M54.17 RADICULOPATHY, LUMBOSACRAL REGION: ICD-10-CM

## 2024-10-22 PROCEDURE — 99214 OFFICE O/P EST MOD 30 MIN: CPT

## 2024-11-12 ENCOUNTER — APPOINTMENT (OUTPATIENT)
Dept: PAIN MANAGEMENT | Facility: CLINIC | Age: 64
End: 2024-11-12
Payer: COMMERCIAL

## 2024-11-12 DIAGNOSIS — M54.17 RADICULOPATHY, LUMBOSACRAL REGION: ICD-10-CM

## 2024-11-12 PROCEDURE — 64483 NJX AA&/STRD TFRM EPI L/S 1: CPT | Mod: LT

## 2024-11-12 PROCEDURE — J3490M: CUSTOM

## 2024-11-26 ENCOUNTER — APPOINTMENT (OUTPATIENT)
Dept: PAIN MANAGEMENT | Facility: CLINIC | Age: 64
End: 2024-11-26
Payer: COMMERCIAL

## 2024-11-26 VITALS — WEIGHT: 310 LBS | HEIGHT: 72 IN | BODY MASS INDEX: 41.99 KG/M2

## 2024-11-26 DIAGNOSIS — M54.50 LOW BACK PAIN, UNSPECIFIED: ICD-10-CM

## 2024-11-26 PROCEDURE — 99214 OFFICE O/P EST MOD 30 MIN: CPT

## 2024-12-05 ENCOUNTER — APPOINTMENT (OUTPATIENT)
Dept: PAIN MANAGEMENT | Facility: CLINIC | Age: 64
End: 2024-12-05
Payer: COMMERCIAL

## 2024-12-05 DIAGNOSIS — M54.17 RADICULOPATHY, LUMBOSACRAL REGION: ICD-10-CM

## 2024-12-05 PROCEDURE — 64484 NJX AA&/STRD TFRM EPI L/S EA: CPT | Mod: LT,59

## 2024-12-05 PROCEDURE — J3490M: CUSTOM

## 2024-12-05 PROCEDURE — 64483 NJX AA&/STRD TFRM EPI L/S 1: CPT | Mod: LT

## 2024-12-17 ENCOUNTER — APPOINTMENT (OUTPATIENT)
Dept: PAIN MANAGEMENT | Facility: CLINIC | Age: 64
End: 2024-12-17
Payer: COMMERCIAL

## 2024-12-17 VITALS — WEIGHT: 311 LBS | HEIGHT: 72 IN | BODY MASS INDEX: 42.12 KG/M2

## 2024-12-17 DIAGNOSIS — M54.50 LOW BACK PAIN, UNSPECIFIED: ICD-10-CM

## 2024-12-17 PROCEDURE — 99214 OFFICE O/P EST MOD 30 MIN: CPT

## 2024-12-17 RX ORDER — GABAPENTIN 300 MG/1
300 CAPSULE ORAL
Qty: 60 | Refills: 0 | Status: ACTIVE | COMMUNITY
Start: 2024-12-17 | End: 1900-01-01

## 2024-12-24 ENCOUNTER — APPOINTMENT (OUTPATIENT)
Dept: PAIN MANAGEMENT | Facility: CLINIC | Age: 64
End: 2024-12-24
Payer: COMMERCIAL

## 2024-12-24 PROCEDURE — 62323 NJX INTERLAMINAR LMBR/SAC: CPT

## 2025-01-03 ENCOUNTER — APPOINTMENT (OUTPATIENT)
Dept: PAIN MANAGEMENT | Facility: CLINIC | Age: 65
End: 2025-01-03
Payer: COMMERCIAL

## 2025-01-03 VITALS — BODY MASS INDEX: 42.39 KG/M2 | WEIGHT: 313 LBS | HEIGHT: 72 IN

## 2025-01-03 DIAGNOSIS — M54.50 LOW BACK PAIN, UNSPECIFIED: ICD-10-CM

## 2025-01-03 DIAGNOSIS — M54.17 RADICULOPATHY, LUMBOSACRAL REGION: ICD-10-CM

## 2025-01-03 PROCEDURE — 99214 OFFICE O/P EST MOD 30 MIN: CPT

## 2025-01-06 ENCOUNTER — APPOINTMENT (OUTPATIENT)
Dept: ORTHOPEDIC SURGERY | Facility: CLINIC | Age: 65
End: 2025-01-06
Payer: COMMERCIAL

## 2025-01-06 DIAGNOSIS — M94.252 CHONDROMALACIA, LEFT HIP: ICD-10-CM

## 2025-01-06 PROBLEM — M65.952 SYNOVITIS OF LEFT HIP: Status: ACTIVE | Noted: 2024-06-21

## 2025-01-06 PROCEDURE — 99214 OFFICE O/P EST MOD 30 MIN: CPT

## 2025-01-07 ENCOUNTER — RESULT REVIEW (OUTPATIENT)
Age: 65
End: 2025-01-07

## 2025-01-07 ENCOUNTER — APPOINTMENT (OUTPATIENT)
Dept: PAIN MANAGEMENT | Facility: CLINIC | Age: 65
End: 2025-01-07

## 2025-01-08 ENCOUNTER — APPOINTMENT (OUTPATIENT)
Dept: MRI IMAGING | Facility: CLINIC | Age: 65
End: 2025-01-08
Payer: COMMERCIAL

## 2025-01-08 PROCEDURE — 73721 MRI JNT OF LWR EXTRE W/O DYE: CPT | Mod: LT

## 2025-01-09 ENCOUNTER — APPOINTMENT (OUTPATIENT)
Dept: NEUROLOGY | Facility: CLINIC | Age: 65
End: 2025-01-09

## 2025-01-13 ENCOUNTER — APPOINTMENT (OUTPATIENT)
Dept: ORTHOPEDIC SURGERY | Facility: CLINIC | Age: 65
End: 2025-01-13
Payer: COMMERCIAL

## 2025-01-13 DIAGNOSIS — M16.12 UNILATERAL PRIMARY OSTEOARTHRITIS, LEFT HIP: ICD-10-CM

## 2025-01-13 PROCEDURE — 99213 OFFICE O/P EST LOW 20 MIN: CPT | Mod: 95

## 2025-01-15 PROBLEM — M16.12 ARTHRITIS OF LEFT HIP: Status: ACTIVE | Noted: 2025-01-15

## 2025-01-21 ENCOUNTER — APPOINTMENT (OUTPATIENT)
Dept: PAIN MANAGEMENT | Facility: CLINIC | Age: 65
End: 2025-01-21

## 2025-01-22 ENCOUNTER — APPOINTMENT (OUTPATIENT)
Dept: ORTHOPEDIC SURGERY | Facility: CLINIC | Age: 65
End: 2025-01-22
Payer: COMMERCIAL

## 2025-01-22 DIAGNOSIS — M65.952: ICD-10-CM

## 2025-01-22 DIAGNOSIS — M16.12 UNILATERAL PRIMARY OSTEOARTHRITIS, LEFT HIP: ICD-10-CM

## 2025-01-22 PROCEDURE — 99213 OFFICE O/P EST LOW 20 MIN: CPT

## 2025-01-23 ENCOUNTER — RESULT REVIEW (OUTPATIENT)
Age: 65
End: 2025-01-23

## 2025-01-29 ENCOUNTER — APPOINTMENT (OUTPATIENT)
Dept: ORTHOPEDIC SURGERY | Facility: CLINIC | Age: 65
End: 2025-01-29
Payer: COMMERCIAL

## 2025-01-29 PROCEDURE — 20610 DRAIN/INJ JOINT/BURSA W/O US: CPT | Mod: 50

## 2025-02-05 ENCOUNTER — APPOINTMENT (OUTPATIENT)
Dept: ORTHOPEDIC SURGERY | Facility: CLINIC | Age: 65
End: 2025-02-05
Payer: COMMERCIAL

## 2025-02-05 DIAGNOSIS — M17.12 UNILATERAL PRIMARY OSTEOARTHRITIS, LEFT KNEE: ICD-10-CM

## 2025-02-05 DIAGNOSIS — M17.11 UNILATERAL PRIMARY OSTEOARTHRITIS, RIGHT KNEE: ICD-10-CM

## 2025-02-05 PROCEDURE — 20610 DRAIN/INJ JOINT/BURSA W/O US: CPT | Mod: 50

## 2025-02-05 PROCEDURE — 99024 POSTOP FOLLOW-UP VISIT: CPT

## 2025-02-05 RX ORDER — METHYLPREDNISOLONE 4 MG/1
4 TABLET ORAL
Qty: 1 | Refills: 0 | Status: ACTIVE | COMMUNITY
Start: 2025-02-05 | End: 1900-01-01

## 2025-04-09 ENCOUNTER — APPOINTMENT (OUTPATIENT)
Dept: ORTHOPEDIC SURGERY | Facility: CLINIC | Age: 65
End: 2025-04-09

## 2025-06-04 ENCOUNTER — RX RENEWAL (OUTPATIENT)
Age: 65
End: 2025-06-04

## 2025-08-27 ENCOUNTER — APPOINTMENT (OUTPATIENT)
Dept: ORTHOPEDIC SURGERY | Facility: CLINIC | Age: 65
End: 2025-08-27
Payer: COMMERCIAL

## 2025-08-27 DIAGNOSIS — M19.012 PRIMARY OSTEOARTHRITIS, LEFT SHOULDER: ICD-10-CM

## 2025-08-27 DIAGNOSIS — M75.32 CALCIFIC TENDINITIS OF LEFT SHOULDER: ICD-10-CM

## 2025-08-27 PROCEDURE — 73030 X-RAY EXAM OF SHOULDER: CPT | Mod: LT

## 2025-08-27 PROCEDURE — 73010 X-RAY EXAM OF SHOULDER BLADE: CPT | Mod: LT

## 2025-08-27 PROCEDURE — 20610 DRAIN/INJ JOINT/BURSA W/O US: CPT | Mod: LT

## 2025-08-27 PROCEDURE — J3490M: CUSTOM

## 2025-08-27 PROCEDURE — 99214 OFFICE O/P EST MOD 30 MIN: CPT | Mod: 25

## 2025-09-03 ENCOUNTER — APPOINTMENT (OUTPATIENT)
Dept: ORTHOPEDIC SURGERY | Facility: CLINIC | Age: 65
End: 2025-09-03
Payer: COMMERCIAL

## 2025-09-03 PROCEDURE — 20610 DRAIN/INJ JOINT/BURSA W/O US: CPT | Mod: 50

## 2025-09-10 ENCOUNTER — APPOINTMENT (OUTPATIENT)
Dept: ORTHOPEDIC SURGERY | Facility: CLINIC | Age: 65
End: 2025-09-10
Payer: COMMERCIAL

## 2025-09-10 PROCEDURE — 20610 DRAIN/INJ JOINT/BURSA W/O US: CPT | Mod: 50

## 2025-09-17 ENCOUNTER — APPOINTMENT (OUTPATIENT)
Dept: ORTHOPEDIC SURGERY | Facility: CLINIC | Age: 65
End: 2025-09-17
Payer: COMMERCIAL

## 2025-09-17 DIAGNOSIS — M17.12 UNILATERAL PRIMARY OSTEOARTHRITIS, LEFT KNEE: ICD-10-CM

## 2025-09-17 DIAGNOSIS — M17.11 UNILATERAL PRIMARY OSTEOARTHRITIS, RIGHT KNEE: ICD-10-CM

## 2025-09-17 PROCEDURE — 20610 DRAIN/INJ JOINT/BURSA W/O US: CPT | Mod: 50
